# Patient Record
Sex: MALE | Race: WHITE | NOT HISPANIC OR LATINO | Employment: FULL TIME | URBAN - METROPOLITAN AREA
[De-identification: names, ages, dates, MRNs, and addresses within clinical notes are randomized per-mention and may not be internally consistent; named-entity substitution may affect disease eponyms.]

---

## 2023-10-12 PROBLEM — E78.2 MIXED HYPERLIPIDEMIA: Status: ACTIVE | Noted: 2023-10-12

## 2023-10-12 PROBLEM — Z86.16 PERSONAL HISTORY OF COVID-19: Status: ACTIVE | Noted: 2023-09-08

## 2023-10-12 PROBLEM — Z86.79 HISTORY OF ATRIAL FIBRILLATION: Status: ACTIVE | Noted: 2023-10-12

## 2023-10-12 PROBLEM — R93.1 ELEVATED CORONARY ARTERY CALCIUM SCORE: Status: ACTIVE | Noted: 2023-10-12

## 2023-10-12 PROBLEM — G47.33 OSA ON CPAP: Status: ACTIVE | Noted: 2023-10-12

## 2023-10-12 PROBLEM — E78.5 DYSLIPIDEMIA: Status: ACTIVE | Noted: 2023-09-08

## 2023-10-12 PROBLEM — I48.19 PERSISTENT ATRIAL FIBRILLATION (MULTI): Status: ACTIVE | Noted: 2023-10-12

## 2023-10-12 PROBLEM — R97.20 ELEVATED PSA: Status: ACTIVE | Noted: 2023-10-12

## 2023-10-12 PROBLEM — N40.0 BENIGN PROSTATIC HYPERPLASIA WITHOUT URINARY OBSTRUCTION: Status: ACTIVE | Noted: 2023-10-12

## 2023-10-12 RX ORDER — ATORVASTATIN CALCIUM 10 MG/1
10 TABLET, FILM COATED ORAL
COMMUNITY
Start: 2022-01-12 | End: 2023-10-18 | Stop reason: ALTCHOICE

## 2023-10-12 RX ORDER — METOPROLOL SUCCINATE 50 MG/1
50 TABLET, EXTENDED RELEASE ORAL EVERY 12 HOURS
COMMUNITY
Start: 2023-07-10 | End: 2023-10-18 | Stop reason: ALTCHOICE

## 2023-10-12 RX ORDER — DIGOXIN 250 MCG
250 TABLET ORAL DAILY
COMMUNITY
Start: 2021-11-23 | End: 2023-10-18 | Stop reason: ALTCHOICE

## 2023-10-14 PROBLEM — I48.0 PAROXYSMAL ATRIAL FIBRILLATION (MULTI): Status: ACTIVE | Noted: 2023-10-14

## 2023-10-14 PROBLEM — R53.81 MALAISE AND FATIGUE: Status: ACTIVE | Noted: 2023-10-14

## 2023-10-14 PROBLEM — R53.83 MALAISE AND FATIGUE: Status: ACTIVE | Noted: 2023-10-14

## 2023-10-16 ENCOUNTER — OFFICE VISIT (OUTPATIENT)
Dept: CARDIOLOGY | Facility: CLINIC | Age: 63
End: 2023-10-16
Payer: COMMERCIAL

## 2023-10-16 VITALS
DIASTOLIC BLOOD PRESSURE: 86 MMHG | SYSTOLIC BLOOD PRESSURE: 130 MMHG | HEIGHT: 69 IN | OXYGEN SATURATION: 96 % | BODY MASS INDEX: 32.88 KG/M2 | HEART RATE: 73 BPM | RESPIRATION RATE: 20 BRPM | WEIGHT: 222 LBS

## 2023-10-16 DIAGNOSIS — Z51.81 ENCOUNTER FOR MONITORING DOFETILIDE THERAPY: ICD-10-CM

## 2023-10-16 DIAGNOSIS — I48.19 PERSISTENT ATRIAL FIBRILLATION (MULTI): Primary | ICD-10-CM

## 2023-10-16 DIAGNOSIS — Z79.899 ENCOUNTER FOR MONITORING DOFETILIDE THERAPY: ICD-10-CM

## 2023-10-16 PROCEDURE — 3008F BODY MASS INDEX DOCD: CPT | Performed by: NURSE PRACTITIONER

## 2023-10-16 PROCEDURE — 93010 ELECTROCARDIOGRAM REPORT: CPT | Performed by: INTERNAL MEDICINE

## 2023-10-16 PROCEDURE — 93005 ELECTROCARDIOGRAM TRACING: CPT | Performed by: NURSE PRACTITIONER

## 2023-10-16 PROCEDURE — 99214 OFFICE O/P EST MOD 30 MIN: CPT | Performed by: NURSE PRACTITIONER

## 2023-10-16 PROCEDURE — 1036F TOBACCO NON-USER: CPT | Performed by: NURSE PRACTITIONER

## 2023-10-18 ASSESSMENT — ENCOUNTER SYMPTOMS
PND: 0
HEMOPTYSIS: 0
SPUTUM PRODUCTION: 0
ORTHOPNEA: 0
NAUSEA: 0
WEAKNESS: 0
DOUBLE VISION: 0
LIGHT-HEADEDNESS: 0
SNORING: 0
NEAR-SYNCOPE: 0
DYSPNEA ON EXERTION: 0
COUGH: 0
SORE THROAT: 0
DIZZINESS: 0
FEVER: 0
MYALGIAS: 0
IRREGULAR HEARTBEAT: 0
SYNCOPE: 0
BLURRED VISION: 0
PALPITATIONS: 0
VOMITING: 0
ABDOMINAL PAIN: 0
DIARRHEA: 0
DIAPHORESIS: 0
FALLS: 0
SHORTNESS OF BREATH: 0
HEADACHES: 0

## 2023-10-18 NOTE — PROGRESS NOTES
Subjective   Moiz Reynoso is a 63 y.o. male.    Chief Complaint:  Atrial Fibrillation    Moiz Reynoso is a 61 y/o male presenting for 1 akash follow up post Dofetilide initiation.    Pt was initially referred by Dr Ospina for evaluation of AF.   PMH includes BPH, depression, DLD, KITTY (CPAP), and AF/AFL.   Treatment of his FA includes metoprolol & digoxin.   Symptoms of his AF include YOUNG, rapid HR's, and fatigue.     In the past, patient had been scheduled for atrial fibrillation ablation, however it was canceled due to family emergency.  He never rescheduled.  He took himself off all his medications because he did not feel any different on them and the anticoagulation was expensive.  He last saw Dr. Hay in July 2023, patient was in persistent A-fib and then agreed for dofetilide initiation with subsequent ablation.  He underwent dofetilide initiation 9/5/2023 - 9/8/2023.  He is scheduled for A-fib ablation on November 16    ECG 10/16/2023: Normal sinus rhythm, 76 bpm, left axis deviation, QTc 445 ms    Today patient is doing well 1 month post dofetilide initiation.  He wants to make sure that he should still have the ablation next month.  He denies any chest pain, shortness of breath, headache, diarrhea, dizziness, palpitations and syncope.  Stress Echo 11/17/21: EF 59%, negative for ischemia  Holter monitor 11/2021: persistent AF w/ avg HR of 111 bpm      Atrial Fibrillation  Symptoms are negative for chest pain, dizziness, palpitations, shortness of breath, syncope and weakness. Past medical history includes atrial fibrillation.       Review of Systems   Constitutional: Negative for diaphoresis, fever and malaise/fatigue.   HENT:  Negative for congestion and sore throat.    Eyes:  Negative for blurred vision and double vision.   Cardiovascular:  Negative for chest pain, dyspnea on exertion, irregular heartbeat, leg swelling, near-syncope, orthopnea, palpitations, paroxysmal nocturnal dyspnea and syncope.    Respiratory:  Negative for cough, hemoptysis, shortness of breath, snoring and sputum production.    Hematologic/Lymphatic: Negative for bleeding problem.   Skin:  Negative for rash.   Musculoskeletal:  Negative for falls, joint pain and myalgias.   Gastrointestinal:  Negative for abdominal pain, diarrhea, nausea and vomiting.   Neurological:  Negative for dizziness, headaches, light-headedness and weakness.   All other systems reviewed and are negative.      Objective   Constitutional:       Appearance: Healthy appearance. Not in distress.   Eyes:      Conjunctiva/sclera: Conjunctivae normal.   HENT:      Nose: Nose normal.    Mouth/Throat:      Pharynx: Oropharynx is clear.   Pulmonary:      Effort: Pulmonary effort is normal.      Breath sounds: Normal breath sounds. No wheezing. No rhonchi.   Chest:      Chest wall: Not tender to palpatation.   Cardiovascular:      Normal rate. Regular rhythm.      Murmurs: There is no murmur.      No rub.   Pulses:     Intact distal pulses.   Edema:     Peripheral edema absent.   Abdominal:      General: Bowel sounds are normal.      Palpations: Abdomen is soft.   Musculoskeletal: Normal range of motion.      Cervical back: Neck supple. Skin:     General: Skin is warm and dry.   Neurological:      Mental Status: Alert and oriented to person, place and time.      Motor: Motor function is intact.       Assessment/Plan   The encounter diagnosis was Persistent atrial fibrillation (CMS/HCC).  Patient maintaining normal sinus rhythm on dofetilide.  We discussed the risks and benefits of radiofrequency ablation, despite maintaining normal rhythm on dofetilide.  Efficacy of antiarrhythmic drugs after 5 years is only about 30%.  Patient's goal is to eventually get off all medications.  Patient encouraged to proceed with ablation in November and he could potentially stop both dofetilide and Xarelto in the future  All questions asked and answered    Continue dofetilide and Xarelto  Plan  for A-fib ablation in November, I will follow-up with him 1 month post ablation.

## 2023-10-28 LAB
ATRIAL RATE: 76 BPM
P AXIS: 11 DEGREES
P OFFSET: 185 MS
P ONSET: 150 MS
PR INTERVAL: 124 MS
Q ONSET: 212 MS
QRS COUNT: 12 BEATS
QRS DURATION: 82 MS
QT INTERVAL: 396 MS
QTC CALCULATION(BAZETT): 445 MS
QTC FREDERICIA: 428 MS
R AXIS: -39 DEGREES
T AXIS: 24 DEGREES
T OFFSET: 410 MS
VENTRICULAR RATE: 76 BPM

## 2023-10-31 ENCOUNTER — PHARMACY VISIT (OUTPATIENT)
Dept: PHARMACY | Facility: CLINIC | Age: 63
End: 2023-10-31
Payer: COMMERCIAL

## 2023-10-31 PROCEDURE — RXMED WILLOW AMBULATORY MEDICATION CHARGE

## 2023-10-31 RX ORDER — POLYETHYLENE GLYCOL 3350, SODIUM SULFATE ANHYDROUS, SODIUM BICARBONATE, SODIUM CHLORIDE, POTASSIUM CHLORIDE 236; 22.74; 6.74; 5.86; 2.97 G/4L; G/4L; G/4L; G/4L; G/4L
POWDER, FOR SOLUTION ORAL
Qty: 4000 ML | Refills: 0 | OUTPATIENT
Start: 2023-10-31 | End: 2023-11-17 | Stop reason: HOSPADM

## 2023-11-15 NOTE — H&P
History Of Present Illness  Moiz Reynoso is a 63 y.o. male presenting for his elective RFA procedure for his persistence AF.    Moiz Reynoso is a 61 y/o male presenting for 1.5 yr follow-up for AF.      Pt was initially referred by Dr Ospina for evaluation of AF.      PMH includes BPH, depression, DLD, KITTY (CPAP), and AF/AFL.      Treatment of his AF includes metoprolol & digoxin.      Symptoms of his AF include YOUNG, rapid HR's, and fatigue.      Pt was last seen in 12/2021 to discuss AF ablation for persistent AF. Pt was scheduled for AF ablation on 4/26/23. Pt had to cancel his procedure due to a family emergency. Pt did not elect to reschedule his procedure due to reservations with the procedure at that time. He followed up with Dr Ospina in 11/2022 and remained rate controlled in AF. Pt recently was evaluated by Dr Ospina in November. He wore a heart monitor at the time which showed a 100% AF burden w/ an avg HR of 111 bpm. Digoxin was added at that time for better HR control.      Pt was previously managed on metoprolol, digoxin and Eliquis. HE took himself all his medications as he did not feel any different while on them. He stopped his blood thinner due to cost. Pt presents today to discuss AF ablation for persistent AF. Since, pt has continued to not symptoms associated with his AF. Pt presents today to re-discuss AF ablation for management of his persistent AF.      Stress Echo 11/17/21: EF 59%, negative for ischemia  Holter monitor 11/2021: persistent AF w/ avg HR of 111 bpm     Past Medical History  Past Medical History:   Diagnosis Date    Personal history of other diseases of the circulatory system     History of atrial fibrillation    Personal history of other diseases of the nervous system and sense organs     History of sleep apnea       Surgical History  Past Surgical History:   Procedure Laterality Date    OTHER SURGICAL HISTORY  11/24/2021    Meniscus repair        Social History  He  reports that he has never smoked. He has never used smokeless tobacco. He reports that he does not currently use alcohol. He reports that he does not use drugs.    Family History  Family History   Problem Relation Name Age of Onset    Arthritis Mother      Diabetes Father          Allergies  Patient has no known allergies.    Review of Systems   All other systems reviewed and are negative.       Physical Exam  Vitals reviewed.   Constitutional:       Appearance: Normal appearance. He is normal weight.   HENT:      Head: Normocephalic and atraumatic.      Nose: Nose normal.      Mouth/Throat:      Mouth: Mucous membranes are moist.   Eyes:      Extraocular Movements: Extraocular movements intact.      Conjunctiva/sclera: Conjunctivae normal.      Pupils: Pupils are equal, round, and reactive to light.   Cardiovascular:      Rate and Rhythm: Normal rate and regular rhythm.      Pulses: Normal pulses.      Heart sounds: Normal heart sounds.   Pulmonary:      Effort: Pulmonary effort is normal.      Breath sounds: Normal breath sounds.   Abdominal:      General: Abdomen is flat.   Musculoskeletal:         General: Normal range of motion.      Cervical back: Normal range of motion and neck supple.   Skin:     General: Skin is warm.   Neurological:      General: No focal deficit present.      Mental Status: He is alert and oriented to person, place, and time. Mental status is at baseline.          Last Recorded Vitals  There were no vitals taken for this visit.    Relevant Results         Assessment/Plan   Active Problems:  There are no active Hospital Problems.    EP will perform the elective RFA procedure for his persistent AF with trans-septal catheterization and general anesthesia. His recently started  Dofetilide 250 mcg BID will be resumed after the completion of this procedure.    I spent 20 minutes in the professional and overall care of this patient.      Edgard Shankar MD

## 2023-11-16 ENCOUNTER — HOSPITAL ENCOUNTER (OUTPATIENT)
Facility: HOSPITAL | Age: 63
Discharge: HOME | End: 2023-11-17
Attending: INTERNAL MEDICINE | Admitting: INTERNAL MEDICINE
Payer: COMMERCIAL

## 2023-11-16 ENCOUNTER — ANESTHESIA EVENT (OUTPATIENT)
Dept: CARDIOLOGY | Facility: HOSPITAL | Age: 63
End: 2023-11-16
Payer: COMMERCIAL

## 2023-11-16 ENCOUNTER — ANESTHESIA (OUTPATIENT)
Dept: CARDIOLOGY | Facility: HOSPITAL | Age: 63
End: 2023-11-16
Payer: COMMERCIAL

## 2023-11-16 DIAGNOSIS — I48.19 PERSISTENT ATRIAL FIBRILLATION (MULTI): ICD-10-CM

## 2023-11-16 DIAGNOSIS — I48.91 ATRIAL FIBRILLATION (MULTI): Primary | ICD-10-CM

## 2023-11-16 PROCEDURE — 2500000004 HC RX 250 GENERAL PHARMACY W/ HCPCS (ALT 636 FOR OP/ED): Performed by: ANESTHESIOLOGY

## 2023-11-16 PROCEDURE — 2500000004 HC RX 250 GENERAL PHARMACY W/ HCPCS (ALT 636 FOR OP/ED): Performed by: INTERNAL MEDICINE

## 2023-11-16 PROCEDURE — C1766 INTRO/SHEATH,STRBLE,NON-PEEL: HCPCS | Performed by: INTERNAL MEDICINE

## 2023-11-16 PROCEDURE — 3700000002 HC GENERAL ANESTHESIA TIME - EACH INCREMENTAL 1 MINUTE: Performed by: INTERNAL MEDICINE

## 2023-11-16 PROCEDURE — 93657 TX L/R ATRIAL FIB ADDL: CPT | Performed by: INTERNAL MEDICINE

## 2023-11-16 PROCEDURE — 36620 INSERTION CATHETER ARTERY: CPT | Performed by: INTERNAL MEDICINE

## 2023-11-16 PROCEDURE — 7100000011 HC EXTENDED STAY RECOVERY HOURLY - NURSING UNIT

## 2023-11-16 PROCEDURE — 7100000009 HC PHASE TWO TIME - INITIAL BASE CHARGE: Performed by: INTERNAL MEDICINE

## 2023-11-16 PROCEDURE — A93656 PR EPHYS EVL TRNSPTL TX ATRIAL FIB ISOLAT PULM VEIN: Performed by: ANESTHESIOLOGY

## 2023-11-16 PROCEDURE — 93010 ELECTROCARDIOGRAM REPORT: CPT | Performed by: INTERNAL MEDICINE

## 2023-11-16 PROCEDURE — C1731 CATH, EP, 20 OR MORE ELEC: HCPCS | Performed by: INTERNAL MEDICINE

## 2023-11-16 PROCEDURE — C2630 CATH, EP, COOL-TIP: HCPCS | Performed by: INTERNAL MEDICINE

## 2023-11-16 PROCEDURE — 36620 INSERTION CATHETER ARTERY: CPT | Performed by: ANESTHESIOLOGY

## 2023-11-16 PROCEDURE — 92960 CARDIOVERSION ELECTRIC EXT: CPT | Performed by: INTERNAL MEDICINE

## 2023-11-16 PROCEDURE — C1759 CATH, INTRA ECHOCARDIOGRAPHY: HCPCS | Performed by: INTERNAL MEDICINE

## 2023-11-16 PROCEDURE — 2500000004 HC RX 250 GENERAL PHARMACY W/ HCPCS (ALT 636 FOR OP/ED): Performed by: ANESTHESIOLOGIST ASSISTANT

## 2023-11-16 PROCEDURE — 93005 ELECTROCARDIOGRAM TRACING: CPT | Mod: 59

## 2023-11-16 PROCEDURE — 93656 COMPRE EP EVAL ABLTJ ATR FIB: CPT | Performed by: INTERNAL MEDICINE

## 2023-11-16 PROCEDURE — 85347 COAGULATION TIME ACTIVATED: CPT

## 2023-11-16 PROCEDURE — C1760 CLOSURE DEV, VASC: HCPCS | Performed by: INTERNAL MEDICINE

## 2023-11-16 PROCEDURE — 2500000005 HC RX 250 GENERAL PHARMACY W/O HCPCS: Performed by: ANESTHESIOLOGIST ASSISTANT

## 2023-11-16 PROCEDURE — C1730 CATH, EP, 19 OR FEW ELECT: HCPCS | Performed by: INTERNAL MEDICINE

## 2023-11-16 PROCEDURE — 2780000003 HC OR 278 NO HCPCS: Performed by: INTERNAL MEDICINE

## 2023-11-16 PROCEDURE — 7100000010 HC PHASE TWO TIME - EACH INCREMENTAL 1 MINUTE: Performed by: INTERNAL MEDICINE

## 2023-11-16 PROCEDURE — 2720000007 HC OR 272 NO HCPCS: Performed by: INTERNAL MEDICINE

## 2023-11-16 PROCEDURE — 3700000001 HC GENERAL ANESTHESIA TIME - INITIAL BASE CHARGE: Performed by: INTERNAL MEDICINE

## 2023-11-16 PROCEDURE — A93656 PR EPHYS EVL TRNSPTL TX ATRIAL FIB ISOLAT PULM VEIN: Performed by: ANESTHESIOLOGIST ASSISTANT

## 2023-11-16 RX ORDER — PROPOFOL 10 MG/ML
INJECTION, EMULSION INTRAVENOUS AS NEEDED
Status: DISCONTINUED | OUTPATIENT
Start: 2023-11-16 | End: 2023-11-16

## 2023-11-16 RX ORDER — ONDANSETRON HYDROCHLORIDE 2 MG/ML
INJECTION, SOLUTION INTRAVENOUS AS NEEDED
Status: DISCONTINUED | OUTPATIENT
Start: 2023-11-16 | End: 2023-11-16

## 2023-11-16 RX ORDER — PANTOPRAZOLE SODIUM 40 MG/1
40 TABLET, DELAYED RELEASE ORAL
Status: DISCONTINUED | OUTPATIENT
Start: 2023-11-17 | End: 2023-11-17 | Stop reason: HOSPADM

## 2023-11-16 RX ORDER — PROPOFOL 10 MG/ML
INJECTION, EMULSION INTRAVENOUS CONTINUOUS PRN
Status: DISCONTINUED | OUTPATIENT
Start: 2023-11-16 | End: 2023-11-16

## 2023-11-16 RX ORDER — MIDAZOLAM HYDROCHLORIDE 1 MG/ML
INJECTION, SOLUTION INTRAMUSCULAR; INTRAVENOUS AS NEEDED
Status: DISCONTINUED | OUTPATIENT
Start: 2023-11-16 | End: 2023-11-16

## 2023-11-16 RX ORDER — SODIUM CHLORIDE, SODIUM LACTATE, POTASSIUM CHLORIDE, CALCIUM CHLORIDE 600; 310; 30; 20 MG/100ML; MG/100ML; MG/100ML; MG/100ML
100 INJECTION, SOLUTION INTRAVENOUS CONTINUOUS
Status: DISCONTINUED | OUTPATIENT
Start: 2023-11-16 | End: 2023-11-17 | Stop reason: HOSPADM

## 2023-11-16 RX ORDER — DOFETILIDE 0.25 MG/1
250 CAPSULE ORAL EVERY 12 HOURS
Status: DISCONTINUED | OUTPATIENT
Start: 2023-11-16 | End: 2023-11-17 | Stop reason: HOSPADM

## 2023-11-16 RX ORDER — ROCURONIUM BROMIDE 10 MG/ML
INJECTION, SOLUTION INTRAVENOUS AS NEEDED
Status: DISCONTINUED | OUTPATIENT
Start: 2023-11-16 | End: 2023-11-16

## 2023-11-16 RX ORDER — HEPARIN SODIUM 1000 [USP'U]/ML
INJECTION, SOLUTION INTRAVENOUS; SUBCUTANEOUS AS NEEDED
Status: DISCONTINUED | OUTPATIENT
Start: 2023-11-16 | End: 2023-11-16 | Stop reason: HOSPADM

## 2023-11-16 RX ORDER — HEPARIN SODIUM 10000 [USP'U]/100ML
INJECTION, SOLUTION INTRAVENOUS CONTINUOUS PRN
Status: DISCONTINUED | OUTPATIENT
Start: 2023-11-16 | End: 2023-11-16 | Stop reason: HOSPADM

## 2023-11-16 RX ORDER — FENTANYL CITRATE 50 UG/ML
INJECTION, SOLUTION INTRAMUSCULAR; INTRAVENOUS AS NEEDED
Status: DISCONTINUED | OUTPATIENT
Start: 2023-11-16 | End: 2023-11-16

## 2023-11-16 RX ORDER — LIDOCAINE HYDROCHLORIDE 10 MG/ML
0.1 INJECTION, SOLUTION EPIDURAL; INFILTRATION; INTRACAUDAL; PERINEURAL ONCE
Status: DISCONTINUED | OUTPATIENT
Start: 2023-11-16 | End: 2023-11-17 | Stop reason: HOSPADM

## 2023-11-16 RX ORDER — PHENYLEPHRINE 10 MG/250 ML(40 MCG/ML)IN 0.9 % SOD.CHLORIDE INTRAVENOUS
CONTINUOUS PRN
Status: DISCONTINUED | OUTPATIENT
Start: 2023-11-16 | End: 2023-11-16

## 2023-11-16 RX ORDER — PROTAMINE SULFATE 10 MG/ML
INJECTION, SOLUTION INTRAVENOUS AS NEEDED
Status: DISCONTINUED | OUTPATIENT
Start: 2023-11-16 | End: 2023-11-16

## 2023-11-16 RX ADMIN — ROCURONIUM BROMIDE 20 MG: 10 INJECTION, SOLUTION INTRAVENOUS at 16:25

## 2023-11-16 RX ADMIN — ROCURONIUM BROMIDE 20 MG: 10 INJECTION, SOLUTION INTRAVENOUS at 17:02

## 2023-11-16 RX ADMIN — ROCURONIUM BROMIDE 20 MG: 10 INJECTION, SOLUTION INTRAVENOUS at 15:04

## 2023-11-16 RX ADMIN — SUGAMMADEX 200 MG: 100 INJECTION, SOLUTION INTRAVENOUS at 18:40

## 2023-11-16 RX ADMIN — PROPOFOL 200 MCG/KG/MIN: 10 INJECTION, EMULSION INTRAVENOUS at 14:28

## 2023-11-16 RX ADMIN — SUGAMMADEX 200 MG: 100 INJECTION, SOLUTION INTRAVENOUS at 18:26

## 2023-11-16 RX ADMIN — ROCURONIUM BROMIDE 20 MG: 10 INJECTION, SOLUTION INTRAVENOUS at 17:31

## 2023-11-16 RX ADMIN — SODIUM CHLORIDE, POTASSIUM CHLORIDE, SODIUM LACTATE AND CALCIUM CHLORIDE 100 ML/HR: 600; 310; 30; 20 INJECTION, SOLUTION INTRAVENOUS at 20:40

## 2023-11-16 RX ADMIN — PROTAMINE SULFATE 30 MG: 10 INJECTION, SOLUTION INTRAVENOUS at 18:17

## 2023-11-16 RX ADMIN — MIDAZOLAM 2 MG: 1 INJECTION INTRAMUSCULAR; INTRAVENOUS at 14:25

## 2023-11-16 RX ADMIN — ONDANSETRON 4 MG: 2 INJECTION, SOLUTION INTRAMUSCULAR; INTRAVENOUS at 18:22

## 2023-11-16 RX ADMIN — Medication 0.3 MCG/KG/MIN: at 14:47

## 2023-11-16 RX ADMIN — ROCURONIUM BROMIDE 80 MG: 10 INJECTION, SOLUTION INTRAVENOUS at 14:28

## 2023-11-16 RX ADMIN — ROCURONIUM BROMIDE 20 MG: 10 INJECTION, SOLUTION INTRAVENOUS at 15:25

## 2023-11-16 RX ADMIN — ROCURONIUM BROMIDE 20 MG: 10 INJECTION, SOLUTION INTRAVENOUS at 15:53

## 2023-11-16 RX ADMIN — SODIUM CHLORIDE, SODIUM LACTATE, POTASSIUM CHLORIDE, AND CALCIUM CHLORIDE: 600; 310; 30; 20 INJECTION, SOLUTION INTRAVENOUS at 14:25

## 2023-11-16 RX ADMIN — FENTANYL CITRATE 100 MCG: 50 INJECTION, SOLUTION INTRAMUSCULAR; INTRAVENOUS at 14:28

## 2023-11-16 RX ADMIN — PROPOFOL 200 MG: 10 INJECTION, EMULSION INTRAVENOUS at 14:28

## 2023-11-16 SDOH — SOCIAL STABILITY: SOCIAL INSECURITY: WERE YOU ABLE TO COMPLETE ALL THE BEHAVIORAL HEALTH SCREENINGS?: YES

## 2023-11-16 SDOH — SOCIAL STABILITY: SOCIAL INSECURITY: DO YOU FEEL ANYONE HAS EXPLOITED OR TAKEN ADVANTAGE OF YOU FINANCIALLY OR OF YOUR PERSONAL PROPERTY?: NO

## 2023-11-16 SDOH — SOCIAL STABILITY: SOCIAL INSECURITY: DOES ANYONE TRY TO KEEP YOU FROM HAVING/CONTACTING OTHER FRIENDS OR DOING THINGS OUTSIDE YOUR HOME?: NO

## 2023-11-16 SDOH — SOCIAL STABILITY: SOCIAL INSECURITY: HAVE YOU HAD THOUGHTS OF HARMING ANYONE ELSE?: NO

## 2023-11-16 SDOH — SOCIAL STABILITY: SOCIAL INSECURITY: HAS ANYONE EVER THREATENED TO HURT YOUR FAMILY OR YOUR PETS?: NO

## 2023-11-16 SDOH — SOCIAL STABILITY: SOCIAL INSECURITY: ARE YOU OR HAVE YOU BEEN THREATENED OR ABUSED PHYSICALLY, EMOTIONALLY, OR SEXUALLY BY ANYONE?: NO

## 2023-11-16 SDOH — SOCIAL STABILITY: SOCIAL INSECURITY: ARE THERE ANY APPARENT SIGNS OF INJURIES/BEHAVIORS THAT COULD BE RELATED TO ABUSE/NEGLECT?: NO

## 2023-11-16 SDOH — HEALTH STABILITY: MENTAL HEALTH: CURRENT SMOKER: 0

## 2023-11-16 SDOH — SOCIAL STABILITY: SOCIAL INSECURITY: ABUSE: ADULT

## 2023-11-16 SDOH — SOCIAL STABILITY: SOCIAL INSECURITY: DO YOU FEEL UNSAFE GOING BACK TO THE PLACE WHERE YOU ARE LIVING?: NO

## 2023-11-16 ASSESSMENT — COGNITIVE AND FUNCTIONAL STATUS - GENERAL
DAILY ACTIVITIY SCORE: 24
PATIENT BASELINE BEDBOUND: NO
MOBILITY SCORE: 24

## 2023-11-16 ASSESSMENT — PATIENT HEALTH QUESTIONNAIRE - PHQ9
2. FEELING DOWN, DEPRESSED OR HOPELESS: NOT AT ALL
1. LITTLE INTEREST OR PLEASURE IN DOING THINGS: NOT AT ALL
SUM OF ALL RESPONSES TO PHQ9 QUESTIONS 1 & 2: 0

## 2023-11-16 ASSESSMENT — LIFESTYLE VARIABLES
AUDIT-C TOTAL SCORE: 0
PRESCIPTION_ABUSE_PAST_12_MONTHS: NO
HOW OFTEN DO YOU HAVE A DRINK CONTAINING ALCOHOL: NEVER
HOW MANY STANDARD DRINKS CONTAINING ALCOHOL DO YOU HAVE ON A TYPICAL DAY: PATIENT DOES NOT DRINK
AUDIT-C TOTAL SCORE: 0
HOW OFTEN DO YOU HAVE 6 OR MORE DRINKS ON ONE OCCASION: NEVER
SKIP TO QUESTIONS 9-10: 1
SUBSTANCE_ABUSE_PAST_12_MONTHS: NO

## 2023-11-16 ASSESSMENT — CHA2DS2 SCORE
CHF OR LEFT VENTRICULAR DYSFUNCTION: NO
VASCULAR DISEASE: NO
AGE IN YEARS: <65
CHA2D2S VASC SCORE: 0
HYPERTENSION: NO
DIABETES: NO
PRIOR STROKE OR TIA OR THROMBOEMBOLISM: NO
SEX: MALE

## 2023-11-16 ASSESSMENT — ACTIVITIES OF DAILY LIVING (ADL)
HEARING - LEFT EAR: FUNCTIONAL
JUDGMENT_ADEQUATE_SAFELY_COMPLETE_DAILY_ACTIVITIES: YES
HEARING - RIGHT EAR: FUNCTIONAL
FEEDING YOURSELF: INDEPENDENT
LACK_OF_TRANSPORTATION: PATIENT DECLINED
DRESSING YOURSELF: INDEPENDENT
GROOMING: INDEPENDENT
TOILETING: INDEPENDENT
ADEQUATE_TO_COMPLETE_ADL: YES
PATIENT'S MEMORY ADEQUATE TO SAFELY COMPLETE DAILY ACTIVITIES?: YES
BATHING: INDEPENDENT
WALKS IN HOME: INDEPENDENT

## 2023-11-16 ASSESSMENT — COLUMBIA-SUICIDE SEVERITY RATING SCALE - C-SSRS
6. HAVE YOU EVER DONE ANYTHING, STARTED TO DO ANYTHING, OR PREPARED TO DO ANYTHING TO END YOUR LIFE?: NO
2. HAVE YOU ACTUALLY HAD ANY THOUGHTS OF KILLING YOURSELF?: NO
1. IN THE PAST MONTH, HAVE YOU WISHED YOU WERE DEAD OR WISHED YOU COULD GO TO SLEEP AND NOT WAKE UP?: NO

## 2023-11-16 ASSESSMENT — PAIN SCALES - GENERAL
PAINLEVEL_OUTOF10: 0 - NO PAIN
PAINLEVEL_OUTOF10: 0 - NO PAIN

## 2023-11-16 ASSESSMENT — PAIN - FUNCTIONAL ASSESSMENT
PAIN_FUNCTIONAL_ASSESSMENT: 0-10
PAIN_FUNCTIONAL_ASSESSMENT: 0-10

## 2023-11-16 NOTE — ANESTHESIA PROCEDURE NOTES
Airway  Date/Time: 11/16/2023 2:31 PM  Urgency: elective    Airway not difficult    Staffing  Performed: BARRY   Authorized by: Brandon Pretty MD    Performed by: BARRY Hartman  Patient location during procedure: OR    Indications and Patient Condition  Indications for airway management: anesthesia  Spontaneous Ventilation: absent  Sedation level: deep  Preoxygenated: yes  Patient position: sniffing  Mask difficulty assessment: 2 - vent by mask + OA or adjuvant +/- NMBA  Planned trial extubation    Final Airway Details  Final airway type: endotracheal airway      Successful airway: ETT  Cuffed: yes   Successful intubation technique: direct laryngoscopy  Facilitating devices/methods: intubating stylet  Blade: Renetta  Blade size: #4  ETT size (mm): 7.5  Cormack-Lehane Classification: grade I - full view of glottis  Placement verified by: chest auscultation and capnometry   Measured from: lips  ETT to lips (cm): 23  Number of attempts at approach: 1

## 2023-11-16 NOTE — ANESTHESIA PROCEDURE NOTES
Arterial Line:    Date/Time: 11/16/2023 2:43 PM    Staffing  Performed: attending   Authorized by: Brandon Pretty MD    Performed by: BARRY Hartman    An arterial line was placed. Procedure performed using ultrasound guidance.in the OR for the following indication(s): continuous blood pressure monitoring and blood sampling needed.    A 20 gauge (size) (length), Angiocath (type) catheter was placed into the Left radial artery, secured by Tegaderm,   Seldinger technique used.  Events:  patient tolerated procedure well with no complications.

## 2023-11-16 NOTE — PROGRESS NOTES
Pharmacy Medication History Review    Moiz Reynoso is a 63 y.o. male admitted for No Principal Problem: There is no principal problem currently on the Problem List. Please update the Problem List and refresh.. Pharmacy reviewed the patient's eydhb-uh-cjfudleid medications and allergies for accuracy.    The list below reflects the updated PTA list. Comments regarding how patient may be taking medications differently can be found in the Admit Orders Activity  Medications Prior to Admission   Medication Sig Dispense Refill Last Dose    rivaroxaban (Xarelto) 20 mg tablet Take 1 tablet (20 mg) by mouth once daily in the evening. Take with meals.   11/15/2023 at pm    diatrizoate fatoumata-diatrizoat sod (Gastrografin) solution to be takern before bed (Patient not taking: Reported on 11/16/2023) 30 mL 0 Not Taking    dofetilide (Tikosyn) 250 mcg capsule TAKE 1 CAPSULE BY MOUTH EVERY 12 HOURS. 60 capsule 0 11/13/2023 at pm    polyethylene glycol-electrolytes (polyethylene glycol) 420 gram solution Begin drinking at noon as directed (Patient not taking: Reported on 11/16/2023) 4000 mL 0 Not Taking        The list below reflects the updated allergy list. Please review each documented allergy for additional clarification and justification.  Allergies  Reviewed by Shantanu Tamayo PharmD on 11/16/2023        Severity Reactions Comments    Latex Low Itching, Rash             Patient declines M2B at discharge. Pharmacy has been updated to [facility] in [place].    Sources used to complete the med history include out patient fill history, OARRS, and patient interview - reliable historian    Below are additional concerns with the patient's PTA list.  N/A    Shantanu Tamayo PharmD  Transitions of Care Pharmacist  [facility]  Please reach out via Secure Chat for questions, or if no response call [employer] or [facility]

## 2023-11-16 NOTE — Clinical Note
Sheath was exchanged in the right femoral vein with INTRODUCER, HEMOSTASIS, STR/J .038 IN, 8.5FR 12CM.

## 2023-11-16 NOTE — ANESTHESIA PROCEDURE NOTES
Peripheral IV  Date/Time: 11/16/2023 2:40 PM      Placement  Needle size: 18 G  Laterality: right  Location: hand  Site prep: alcohol  Attempts: 2

## 2023-11-16 NOTE — Clinical Note
Patient ID band present and verified. Patient contact is in waiting room. Contact(s) present: spouse. Contact name: Salena. Contact # : [phone].

## 2023-11-16 NOTE — ANESTHESIA PREPROCEDURE EVALUATION
Patient: Moiz Reynoso    Procedure Information       Date/Time: 11/16/23 1230    Procedure: Ablation A-Fib Persistant - SDA/RFA/ AFIB/ CARTO/ GA whole    [place] [facility] [place] [facility] Cardiac Cath Lab    Providers: Nico Hay MD            Relevant Problems   Cardiovascular   (+) Mixed hyperlipidemia   (+) Paroxysmal atrial fibrillation (CMS/HCC)   (+) Persistent atrial fibrillation (CMS/HCC)      Pulmonary   (+) KITTY on CPAP       Clinical information reviewed:   Tobacco  Allergies  Meds   Med Hx  Surg Hx   Fam Hx  Soc Hx        NPO Detail:  NPO/Void Status  Date of Last Liquid: 11/15/23  Date of Last Solid: 11/15/23         Physical Exam    Airway  Mallampati: I  TM distance: >3 FB  Neck ROM: full     Cardiovascular   Rhythm: irregular     Dental    Pulmonary    Abdominal            Anesthesia Plan    ASA 3     general     The patient is not a current smoker.    intravenous induction   Anesthetic plan and risks discussed with patient.        EF in 2021 was 60%  GETA with post-induction A-Line.  Procedure explained, risks discussed, all questions answered.  He agrees to proceed.

## 2023-11-16 NOTE — DISCHARGE INSTRUCTIONS
INSTRUCTIONS AFTER ABLATION PROCEDURE:    * You will need to continue blood thinner (Xarelto) until instructed otherwise. It is important not to interrupt blood thinner for any reason (other than an emergency) during the 1st month after ablation. Xarelto needs to be taken daily with a meal in order for the drug to be properly absorbed (eg do not take it on an empty stomach).     * You will be on Pantoprazole (a heartburn medicine) for 4 weeks to protect the esophagus as it can become irritated with ablation. It is very important that you take this medication.    * All other medications will generally remain the same unless you are told otherwise.  Resume taking your home medications today (including blood thinner) as listed on the discharge instructions.    * In the first week post-ablation you should take it easy. No heavy lifting or heavy exercise, no treadmill. You can use the stairs if needed but go slowly and minimize the number of times up and down.    * Some minor bruising is common at each groin access site with minor soreness as if you had banged the area. Bruising may occasionally be seen to extend down the leg. This is normal as is an occasional small quarter sized bump in the area. If larger swelling or more significant pain occurs at the area, please contact the office or go the nearest Emergency Room.    * You may have some minor chest pain for the next week or so. The pain will often worsen with a deep breath and be better when leaning forward. This is pericardial chest pain from the ablation and is generally not of concern. It should resolve within a week although it might increase for a day or so after the ablation.    * If you develop unexplained fevers exceeding 100 degrees anytime within the first 3 weeks post-ablation, you need to contact the office. Low grade fevers of around 99 degrees are common in the first day or so post-ablation.    * Atrial fibrillation (AFib) can recur in all patients  who undergo this ablation for up to 4-8 weeks post-ablation. The ablation itself can cause inflammation (pericarditis) in the atria and this can cause AFib. Some patients will actually experience an increased amount of atrial arrhythmia early after ablation. Approximately 1/3 of patients will have this early recurrence of AFib. Medications should be continued and your heart rate controlled. Nothing else needs be done initially except waiting as in many cases these episodes of AFib will prove self limited.    * Continue to follow up with your primary care physician, primary cardiologist, and any other specialists you normally see.    *No driving for 2 days post procedure (IF you were driving prior to procedure)    *Diet: Heart healthy    Call Provider If:  Breathing faster than normal.     Fever of 100.4 F (38 C) or higher.     Chills.     Any new concerning symptoms.     Passing out.     Patient Instructions, Next 24 hours:  DO NOT drive a car, operate machinery or power tools.  It is recommended that a responsible adult be with you for the first 24 hours.     DO NOT drink any alcoholic drinks or take any non-prescriptive medications that contain alcohol for the first 24 hours.     DO NOT make any important decisions for the first 24 hours.    Activity:  You are advised to go directly home from the hospital.     DO NOT lift anything heavier than 10 pounds for one week, this allows for proper healing of the groin.     No excessive exercise or treadmill use for one week. You may walk and do stairs, slowly.     No sexual activities for 24 hours after you arrive home.    Wound Care:  If slight bleeding should occur at groin site, lie down and have someone apply firm pressure just above the puncture site for 5 minutes.  If it continues or is profuse, call 911. Always notify your doctor if bleeding occurs.     Keep site clean and dry. Let air dry or you may use a simple bandaid.     Gently cleanse the puncture site in  your groin with soap and water only.     You may experience some tenderness, bruising or minimal inflammation.  If you have any concerns, you may contact the EP Lab or if any of these symptoms become excessive, contact your electrophysiologist or go to the emergency room.     No tub baths, soaking, hot tubs, or swimming for one week.     May shower the next day after your procedure.    Other Instructions:  If you have any questions about the effects of the sedative drugs or groin care, call the physician who performed your procedure.    FOLLOW UP:  1) Primary care physician 2 weeks--call to schedule    2) Samira El CNP ( Electrophysiology) 1 month after ablation as scheduled

## 2023-11-17 ENCOUNTER — HOSPITAL ENCOUNTER (OUTPATIENT)
Dept: CARDIOLOGY | Facility: HOSPITAL | Age: 63
Discharge: HOME | End: 2023-11-17

## 2023-11-17 VITALS
SYSTOLIC BLOOD PRESSURE: 122 MMHG | TEMPERATURE: 98.6 F | OXYGEN SATURATION: 100 % | HEIGHT: 69 IN | DIASTOLIC BLOOD PRESSURE: 81 MMHG | WEIGHT: 222.44 LBS | HEART RATE: 85 BPM | BODY MASS INDEX: 32.95 KG/M2 | RESPIRATION RATE: 18 BRPM

## 2023-11-17 LAB
ANION GAP SERPL CALC-SCNC: 9 MMOL/L (ref 10–20)
BUN SERPL-MCNC: 12 MG/DL (ref 6–23)
CALCIUM SERPL-MCNC: 8.9 MG/DL (ref 8.6–10.6)
CHLORIDE SERPL-SCNC: 106 MMOL/L (ref 98–107)
CO2 SERPL-SCNC: 27 MMOL/L (ref 21–32)
CREAT SERPL-MCNC: 0.86 MG/DL (ref 0.5–1.3)
GFR SERPL CREATININE-BSD FRML MDRD: >90 ML/MIN/1.73M*2
GLUCOSE SERPL-MCNC: 144 MG/DL (ref 74–99)
MAGNESIUM SERPL-MCNC: 1.96 MG/DL (ref 1.6–2.4)
POTASSIUM SERPL-SCNC: 4.3 MMOL/L (ref 3.5–5.3)
SODIUM SERPL-SCNC: 138 MMOL/L (ref 136–145)

## 2023-11-17 PROCEDURE — 93010 ELECTROCARDIOGRAM REPORT: CPT | Performed by: INTERNAL MEDICINE

## 2023-11-17 PROCEDURE — 80048 BASIC METABOLIC PNL TOTAL CA: CPT | Performed by: STUDENT IN AN ORGANIZED HEALTH CARE EDUCATION/TRAINING PROGRAM

## 2023-11-17 PROCEDURE — 2500000001 HC RX 250 WO HCPCS SELF ADMINISTERED DRUGS (ALT 637 FOR MEDICARE OP): Performed by: STUDENT IN AN ORGANIZED HEALTH CARE EDUCATION/TRAINING PROGRAM

## 2023-11-17 PROCEDURE — 2500000004 HC RX 250 GENERAL PHARMACY W/ HCPCS (ALT 636 FOR OP/ED): Performed by: STUDENT IN AN ORGANIZED HEALTH CARE EDUCATION/TRAINING PROGRAM

## 2023-11-17 PROCEDURE — 99239 HOSP IP/OBS DSCHRG MGMT >30: CPT | Performed by: PHYSICIAN ASSISTANT

## 2023-11-17 PROCEDURE — 36415 COLL VENOUS BLD VENIPUNCTURE: CPT | Performed by: STUDENT IN AN ORGANIZED HEALTH CARE EDUCATION/TRAINING PROGRAM

## 2023-11-17 PROCEDURE — 7100000011 HC EXTENDED STAY RECOVERY HOURLY - NURSING UNIT

## 2023-11-17 PROCEDURE — 83735 ASSAY OF MAGNESIUM: CPT | Performed by: STUDENT IN AN ORGANIZED HEALTH CARE EDUCATION/TRAINING PROGRAM

## 2023-11-17 PROCEDURE — 93005 ELECTROCARDIOGRAM TRACING: CPT

## 2023-11-17 RX ORDER — PANTOPRAZOLE SODIUM 40 MG/1
40 TABLET, DELAYED RELEASE ORAL
Qty: 30 TABLET | Refills: 0 | Status: SHIPPED | OUTPATIENT
Start: 2023-11-17 | End: 2023-12-01 | Stop reason: WASHOUT

## 2023-11-17 RX ADMIN — DOFETILIDE 250 MCG: 0.25 CAPSULE ORAL at 11:04

## 2023-11-17 RX ADMIN — PANTOPRAZOLE SODIUM 40 MG: 40 TABLET, DELAYED RELEASE ORAL at 06:29

## 2023-11-17 ASSESSMENT — PAIN SCALES - GENERAL: PAINLEVEL_OUTOF10: 0 - NO PAIN

## 2023-11-17 NOTE — ANESTHESIA POSTPROCEDURE EVALUATION
Patient: Moiz Reynoso    Procedure Summary       Date: 11/16/23 Room / Location: [facility] [place] [facility] / Virtual [facility] [place] MAT 3529 Cardiac Cath Lab    Anesthesia Start: 1410 Anesthesia Stop: 1901    Procedure: Ablation A-Fib Persistant Diagnosis:       Persistent atrial fibrillation (CMS/HCC)      (Persistent atrial fibrillation (CMS/HCC) [I48.19])    Providers: Nico Hay MD Responsible Provider: BARRY Hartman    Anesthesia Type: general ASA Status: 3            Anesthesia Type: general    Vitals Value Taken Time   /74 11/16/23 1901   Temp 36.2 11/16/23 1901   Pulse 71 11/16/23 1901   Resp 12 11/16/23 1901   SpO2 98 11/16/23 1901       Anesthesia Post Evaluation    Patient participation: complete - patient participated  Level of consciousness: awake and alert  Pain management: adequate  Airway patency: patent  Cardiovascular status: acceptable  Respiratory status: acceptable  Hydration status: acceptable  Postoperative Nausea and Vomiting: none        No notable events documented.

## 2023-11-17 NOTE — DISCHARGE SUMMARY
"Discharge Diagnosis  Atrial fibrillation s/p pulmonary vein isolation 11/16/23    Test Results Pending At Discharge: none    Hospital Course  64yo M with HLD, CAD (CAC score 1371 1/2022; pt stopped atorva due to thinking it caused DM), \"pre-DM\", normal EF by stress echo 11/2021 at OSH(neg for ischemia), KITTY (CPAP), COVID 8/15/2023 (unvaccinated), obesity (BMI 32.8), and AF/AFL statrted on Tikosyn 250mcg q12h (QT prolongation on 500mcg) 9/2023 prior to planned AF ablation 11/16/23. Metop was stopped 9/2023 for asymptomatic sinus camila.    Pt presented 11/16/23 and underwent AFib ablation (PVI0 without complication. Due to case ending late, pt was kept overnight. It is unclear why pt did not receive Tikosyn dose 11/16 PM (it was not verified by pharmacist). BMP and Mg on 11/17 were acceptable and unremarkable. EKGs post ablation were reviewed (QTc 450ms). Pt had mild intermittent pleuritic chest discomfort c/w mild post ablation pericarditis; exam without pericardial rub and no EKG findings of pericarditis compared to recent past tracings.     Case was reviewed with Dr Hay 11/17; per Dr Hay, pt does not need Tikosyn re-initiation just medication continuation. Pt received Tikosyn 250mcg 11/17 AM and was observed for 2 hours. Pt remained hemodynamically stable without arrhythmia and was in NSR. Dr Hay agreed pt was stable and appropriate for discharge. Pt will F/u with PCP in 2 weeks (pt to call) and with EP NP Samira El in 1 month as scheduled. Pt was advised to cont routine F/u with primary cardiologist as well. Pt was sent home with incentive spirometer (instructed by nurse) to be used 10x q2 hours while awake for 3-4 days to encourage deep breathing after general anesthesia.     Pertinent Physical Exam At Time of Discharge  Gen-A+O x 3  Skin-W+D  Lungs-CTAB  CV-RRR, no M/G/R  Abd-obese, soft, NT/ND, +BS  Extrem-no LE edema; RFV access site dsg removed, no bleeding, hematoma, or ecchymosis; L radial " Puja site without bleeding or hematoma; L radial pulse palpable with warm L hand + fingers  Neuro-ROGERS  Psych-appropriate mood and affect    Home Medications  START taking these medications     pantoprazole 40 mg EC tablet; Commonly known as: ProtoNix; Take 1 tablet   (40 mg) by mouth once daily in the morning. Take before meal. For 1 month   after ablation.     CONTINUE taking these medications     dofetilide 250 mcg capsule; Commonly known as: Tikosyn; TAKE 1 CAPSULE   BY MOUTH EVERY 12 HOURS.   Xarelto 20 mg tablet; Generic drug: rivaroxaban    Outpatient Follow-Up  Future Appointments   Date Time Provider Department Center   11/30/2023  9:00 AM [facility] 4 [facility]   12/14/2023  9:45 AM Samira El, APRN-CNP [id number]     Greater than 30 min were spent on medication education, post ablation teaching, and coordination of discharge.    EKTA Sexton, PA-C, [facility]  Inpatient Cardiac Electrophysiology  Personal pager: [phone] (Tues-Fri)

## 2023-11-17 NOTE — NURSING NOTE
11/17/2023 1:11 pm Nursing Discharge Note  AVS went over with patient. All questions answered. IV and Tele removed. Pt left with all belongings. Patient walked off the floor with his wife.     Tomasa Subramanian RN

## 2023-11-17 NOTE — CARE PLAN
The patient's goals for the shift include  To be discharged     The clinical goals for the shift include Pt will remain HDS throughout shift    Problem: Pain  Goal: My pain/discomfort is manageable  Outcome: Met     Problem: Safety  Goal: Patient will be injury free during hospitalization  Outcome: Met  Goal: I will remain free of falls  Outcome: Met     Problem: Daily Care  Goal: Daily care needs are met  Outcome: Met     Problem: Psychosocial Needs  Goal: Demonstrates ability to cope with hospitalization/illness  Outcome: Met  Goal: Collaborate with me, my family, and caregiver to identify my specific goals  Outcome: Met     Problem: Discharge Barriers  Goal: My discharge needs are met  Outcome: Met     Problem: Pain  Goal: Takes deep breaths with improved pain control throughout the shift  Outcome: Met  Goal: Turns in bed with improved pain control throughout the shift  Outcome: Met  Goal: Walks with improved pain control throughout the shift  Outcome: Met  Goal: Performs ADL's with improved pain control throughout shift  Outcome: Met  Goal: Participates in PT with improved pain control throughout the shift  Outcome: Met  Goal: Free from opioid side effects throughout the shift  Outcome: Met  Goal: Free from acute confusion related to pain meds throughout the shift  Outcome: Met

## 2023-11-20 ENCOUNTER — HOSPITAL ENCOUNTER (OUTPATIENT)
Dept: CARDIOLOGY | Facility: HOSPITAL | Age: 63
Discharge: HOME | End: 2023-11-20
Payer: COMMERCIAL

## 2023-11-20 LAB
ATRIAL RATE: 77 BPM
ATRIAL RATE: 81 BPM
P AXIS: 58 DEGREES
P AXIS: 8 DEGREES
P OFFSET: 186 MS
P OFFSET: 192 MS
P ONSET: 130 MS
P ONSET: 141 MS
PR INTERVAL: 142 MS
PR INTERVAL: 164 MS
Q ONSET: 212 MS
Q ONSET: 212 MS
QRS COUNT: 13 BEATS
QRS COUNT: 13 BEATS
QRS DURATION: 80 MS
QRS DURATION: 86 MS
QT INTERVAL: 386 MS
QT INTERVAL: 398 MS
QTC CALCULATION(BAZETT): 448 MS
QTC CALCULATION(BAZETT): 450 MS
QTC FREDERICIA: 426 MS
QTC FREDERICIA: 432 MS
R AXIS: -23 DEGREES
R AXIS: -30 DEGREES
T AXIS: 24 DEGREES
T AXIS: 28 DEGREES
T OFFSET: 405 MS
T OFFSET: 411 MS
VENTRICULAR RATE: 77 BPM
VENTRICULAR RATE: 81 BPM

## 2023-11-20 PROCEDURE — 93005 ELECTROCARDIOGRAM TRACING: CPT

## 2023-11-30 ENCOUNTER — APPOINTMENT (OUTPATIENT)
Dept: RADIOLOGY | Facility: HOSPITAL | Age: 63
End: 2023-11-30
Payer: COMMERCIAL

## 2023-12-01 ENCOUNTER — LAB (OUTPATIENT)
Dept: LAB | Facility: LAB | Age: 63
End: 2023-12-01
Payer: COMMERCIAL

## 2023-12-01 ENCOUNTER — TELEMEDICINE (OUTPATIENT)
Dept: UROLOGY | Facility: HOSPITAL | Age: 63
End: 2023-12-01
Payer: COMMERCIAL

## 2023-12-01 DIAGNOSIS — R97.20 ELEVATED PSA: ICD-10-CM

## 2023-12-01 DIAGNOSIS — N40.0 BENIGN PROSTATIC HYPERPLASIA WITHOUT URINARY OBSTRUCTION: ICD-10-CM

## 2023-12-01 DIAGNOSIS — R97.20 ELEVATED PSA: Primary | ICD-10-CM

## 2023-12-01 PROCEDURE — 99203 OFFICE O/P NEW LOW 30 MIN: CPT | Performed by: UROLOGY

## 2023-12-01 PROCEDURE — 36415 COLL VENOUS BLD VENIPUNCTURE: CPT

## 2023-12-01 PROCEDURE — 84154 ASSAY OF PSA FREE: CPT

## 2023-12-01 PROCEDURE — 84153 ASSAY OF PSA TOTAL: CPT

## 2023-12-01 NOTE — PROGRESS NOTES
NAME:Moiz Reynoso  DATE: 12/1/2023               Subjective:   Chief complaint: LUTS    HPI:  63 y.o. male presenting for evaluation of LUTS.      Pt with h/o HLD, CAD, KITTY, Obesity, AF s/p ablation    Pt reports around 2010 was told he had an enlarged prostate.  Has had elevated PSA.  MRI in 2017, prostate approx 85cc, no high risk lesions.  Most recent PSA in our system 11.4 (Dec 2021).       Pt with urinary urgency, some urge incontinence, hesitency, weak stream, incomplete emptying. Occasional dysuria.        Past Medical History:   Diagnosis Date    Atrial fibrillation (CMS/HCC)     Personal history of other diseases of the circulatory system     History of atrial fibrillation    Personal history of other diseases of the nervous system and sense organs     History of sleep apnea     Past Surgical History:   Procedure Laterality Date    CARDIAC ELECTROPHYSIOLOGY PROCEDURE N/A 11/16/2023    Procedure: Ablation A-Fib Persistant;  Surgeon: Nico Hay MD;  Location: [place] [address] [facility];  Service: Electrophysiology;  Laterality: N/A;  SDA/RFA/ AFIB/ CARTO/ GA whole    OTHER SURGICAL HISTORY  11/24/2021    Meniscus repair     Social History     Tobacco Use    Smoking status: Never    Smokeless tobacco: Never   Substance Use Topics    Alcohol use: Not Currently     Family History   Problem Relation Name Age of Onset    Arthritis Mother      Diabetes Father       Current Outpatient Medications on File Prior to Visit   Medication Sig Dispense Refill    dofetilide (Tikosyn) 250 mcg capsule TAKE 1 CAPSULE BY MOUTH EVERY 12 HOURS. 60 capsule 0    pantoprazole (ProtoNix) 40 mg EC tablet Take 1 tablet (40 mg) by mouth once daily in the morning. Take before meals. For 1 month after ablation. 30 tablet 0    rivaroxaban (Xarelto) 20 mg tablet Take 1 tablet (20 mg) by mouth once daily in the evening. Take with meals.       No current facility-administered medications on file prior to visit.     Moiz is  "allergic to latex.     Review of Systems    14 point ROS reviewed and discussed with the patient. Pertinent positives/negatives discussed in the History of Present Illness (HPI).      Objective:     Labs  Lab Results   Component Value Date    PSA 11.36 (H) 12/10/2021     No components found for: \"CBC\"  No results found for: \"PROLACTIN\"  Lab Results   Component Value Date    GFRMALE >90 09/08/2023     Lab Results   Component Value Date    CREATININE 0.86 11/17/2023     Lab Results   Component Value Date    TESTF 58.0 12/10/2021     Lab Results   Component Value Date    HCT 45.0 09/05/2023       Assessment/Plan:   Moiz Reynoso presents with     1. Elevated PSA    2. Benign prostatic hyperplasia without urinary obstruction      -Recheck PSA, will get MRI pending  -FU in office for cysto/trus/uroflow    Lower Urinary Tract Symptoms (LUTS)    I educated the patient on relevant lower urinary tract anatomy and physiology with emphasis on differential diagnosis as well as contributing factors to the patient's lower urinary tract symptoms. I educated the patient on dietary and behavioral modifications pertinent to the patient's complaints. I recommended to avoid caffeine, alcohol, spicy and acidic oral intake and to regulate fluid intake and voiding (timed voiding, double voiding). I stressed the importance of avoiding constipation and recommended stool softeners unless diarrhea present. We discussed limiting fluid intake at night and elevating legs prior to bedtime.     The mechanism of action as well as the risks, benefits, common side effects, and alternatives to all prescribed medications were discussed with the patient at length. The patient had the opportunity to ask questions and all questions were answered. I primarily discussed alpha blockers, 5ARIs, PDE5i, anticholinergics, and beta 3 agonist (mirabegron).  I explained that alpha blockers help decrease bladder outlet resistance with potential side effects to " include retrograde ejaculation, rhinitis, and light headedness. I explained that 5 alpha reductase inhibitors can shrink the prostate up to 30%, can artificially decrease their PSA value by 50%, but take approximately 6-9 months to reach full efficacy and have potential side effects to include decreased libido, impotence and breast tenderness.       Elevated PSA  Patient presents today for the evaluation of elevated PSA (Prostate Specific Antigen).  We discussed Prostate cancer screening, and that it is recommended for men aged 55-69, but can also start early in high risk patients ( and patients with family history of prostate cancer) and go longer in active, healthy men.  We discussed the screening process involves a digital rectal exam (LIDIA) and blood test (PSA).      We discussed the role of trans-rectal ultrasound guided prostate biopsy.  I highlighted that it is an office based procedure.  He will be given a dose of antibiotics prior to the procedure.  He was also instructed to give himself an enema the morning of the biopsy.  Risks of the biopsy including pain, blood in the urine, stool and ejaculate which can last a few weeks.  The risk of post prostate biopsy sepsis was discussed and that if signs of infection, such as fevers, chills, lethargy require a prompt evaluation.      I highlighted the role of MRI Prostate in identifying high-risk prostate cancer and images can be used to target those areas with the biopsy.

## 2023-12-04 LAB
PSA FREE MFR SERPL: 12 %
PSA FREE SERPL-MCNC: 1.8 NG/ML
PSA SERPL IA-MCNC: 14.5 NG/ML (ref 0–4)

## 2023-12-05 DIAGNOSIS — R97.20 ELEVATED PSA: Primary | ICD-10-CM

## 2023-12-07 LAB
ACT BLD: 281 SEC (ref 96–152)
ACT BLD: 304 SEC (ref 96–152)
ACT BLD: 317 SEC (ref 96–152)
ACT BLD: 330 SEC (ref 96–152)
ACT BLD: 343 SEC (ref 96–152)
ACT BLD: 345 SEC (ref 96–152)

## 2023-12-14 ENCOUNTER — OFFICE VISIT (OUTPATIENT)
Dept: CARDIOLOGY | Facility: CLINIC | Age: 63
End: 2023-12-14
Payer: COMMERCIAL

## 2023-12-14 ENCOUNTER — APPOINTMENT (OUTPATIENT)
Dept: CARDIOLOGY | Facility: CLINIC | Age: 63
End: 2023-12-14
Payer: COMMERCIAL

## 2023-12-14 VITALS
DIASTOLIC BLOOD PRESSURE: 81 MMHG | HEART RATE: 84 BPM | SYSTOLIC BLOOD PRESSURE: 124 MMHG | BODY MASS INDEX: 33.99 KG/M2 | HEIGHT: 68 IN | WEIGHT: 224.3 LBS

## 2023-12-14 DIAGNOSIS — I48.19 PERSISTENT ATRIAL FIBRILLATION (MULTI): Primary | ICD-10-CM

## 2023-12-14 PROCEDURE — 99214 OFFICE O/P EST MOD 30 MIN: CPT | Performed by: NURSE PRACTITIONER

## 2023-12-14 PROCEDURE — 3008F BODY MASS INDEX DOCD: CPT | Performed by: NURSE PRACTITIONER

## 2023-12-14 PROCEDURE — 93005 ELECTROCARDIOGRAM TRACING: CPT | Performed by: NURSE PRACTITIONER

## 2023-12-14 PROCEDURE — 1036F TOBACCO NON-USER: CPT | Performed by: NURSE PRACTITIONER

## 2023-12-14 PROCEDURE — 93010 ELECTROCARDIOGRAM REPORT: CPT | Performed by: INTERNAL MEDICINE

## 2023-12-14 RX ORDER — DOFETILIDE 0.25 MG/1
250 CAPSULE ORAL EVERY 12 HOURS
Qty: 180 CAPSULE | Refills: 1 | Status: SHIPPED | OUTPATIENT
Start: 2023-12-14 | End: 2024-04-25 | Stop reason: ALTCHOICE

## 2023-12-14 ASSESSMENT — ENCOUNTER SYMPTOMS
SORE THROAT: 0
IRREGULAR HEARTBEAT: 0
DYSPNEA ON EXERTION: 0
NEAR-SYNCOPE: 0
SNORING: 0
HEMOPTYSIS: 0
FALLS: 0
BLURRED VISION: 0
SPUTUM PRODUCTION: 0
COUGH: 0
FEVER: 0
NAUSEA: 0
DOUBLE VISION: 0
DIARRHEA: 0
LIGHT-HEADEDNESS: 0
ORTHOPNEA: 0
MYALGIAS: 0
ABDOMINAL PAIN: 0
DIAPHORESIS: 0
HEADACHES: 0
VOMITING: 0
PND: 0

## 2023-12-14 NOTE — PROGRESS NOTES
"Subjective   Moiz Reynoso is a 63 y.o. male.    Chief Complaint:  Atrial Fibrillation    Moiz Reynoso is a 63 y/o male presenting for 1 month follow up post Afib/Aflutter ablation.    Pt was initially referred by Dr Ospina for evaluation of AF.   PMH includes BPH, depression, DLD, KITTY (CPAP), and AF/AFL.   Treatment of his FA includes metoprolol & digoxin.   Symptoms of his AF include YOUNG, rapid HR's, and fatigue.     He underwent dofetilide initiation 9/5/2023 - 9/8/2023.  He is scheduled for A-fib ablation on November 16  ECG 10/16/2023: Normal sinus rhythm, 76 bpm, left axis deviation, QTc 445 ms    Stress Echo 11/17/21: EF 59%, negative for ischemia  Holter monitor 11/2021: persistent AF w/ avg HR of 111 bpm    Now s/p Afib and CTI RFA with Dr. Hay 11/16/2023  ECG 12/14/2023 NSR HR 77 bpm, Qtc 439 ms    TODAY patient is doing well 1 month post A-fib ablation.  He denies any recurrence of his arrhythmia.  He denies any chest pain, shortness of breath, dizziness, syncope and palpitations.  He denies any bleeding at the right groin site.  He is still taking his dofetilide and his Xarelto without issue.      /81 (BP Location: Right arm, Patient Position: Sitting, BP Cuff Size: Adult)   Pulse 84   Ht 1.727 m (5' 8\")   Wt 102 kg (224 lb 4.8 oz)   BMI 34.10 kg/m²     Current Outpatient Medications on File Prior to Visit   Medication Sig Dispense Refill    [DISCONTINUED] dofetilide (Tikosyn) 250 mcg capsule TAKE 1 CAPSULE BY MOUTH EVERY 12 HOURS. 60 capsule 0    [DISCONTINUED] rivaroxaban (Xarelto) 20 mg tablet Take 1 tablet (20 mg) by mouth once daily in the evening. Take with meals.       No current facility-administered medications on file prior to visit.         Review of Systems   Constitutional: Negative for diaphoresis, fever and malaise/fatigue.   HENT:  Negative for congestion and sore throat.    Eyes:  Negative for blurred vision and double vision.   Cardiovascular:  Negative for dyspnea on " exertion, irregular heartbeat, leg swelling, near-syncope, orthopnea and paroxysmal nocturnal dyspnea.   Respiratory:  Negative for cough, hemoptysis, snoring and sputum production.    Hematologic/Lymphatic: Negative for bleeding problem.   Skin:  Negative for rash.   Musculoskeletal:  Negative for falls, joint pain and myalgias.   Gastrointestinal:  Negative for abdominal pain, diarrhea, nausea and vomiting.   Neurological:  Negative for headaches and light-headedness.   All other systems reviewed and are negative.      Objective   Constitutional:       Appearance: Healthy appearance. Not in distress.   Eyes:      Conjunctiva/sclera: Conjunctivae normal.   HENT:      Nose: Nose normal.    Mouth/Throat:      Pharynx: Oropharynx is clear.   Pulmonary:      Effort: Pulmonary effort is normal.      Breath sounds: Normal breath sounds. No wheezing. No rhonchi.   Chest:      Chest wall: Not tender to palpatation.   Cardiovascular:      Normal rate. Regular rhythm.      Murmurs: There is no murmur.      No rub.   Pulses:     Intact distal pulses.   Edema:     Peripheral edema absent.   Abdominal:      General: Bowel sounds are normal.      Palpations: Abdomen is soft.   Musculoskeletal: Normal range of motion.      Cervical back: Neck supple. Skin:     General: Skin is warm and dry.      Comments: Right groin site well approximated, no signs of bruising/bleeding/swelling/pain   Neurological:      Mental Status: Alert and oriented to person, place and time.      Motor: Motor function is intact.       Assessment/Plan   The encounter diagnosis was Persistent atrial fibrillation (CMS/HCC).  Patient maintaining normal sinus rhythm now 1 month post A-fib ablation.  He is feeling well and has a lot of questions regarding his plan of care in the future.  His [id number] score equals 0, he can stop his blood thinner 3 months post ablation.  I do not recommend stopping it before then due to increased risk of stroke during this  healing time.  Typically we continue antiarrhythmic drugs up to 6 months post ablation before trying to stop it.  We discussed lifestyle modifications that can help maintain normal sinus rhythm such as exercise, weight loss, managing hypertension, treating sleep apnea, and minimizing alcohol intake.  All questions asked and answered.    Follow-up with me in 3 to 4 months, or sooner as needed  Continue dofetilide and Xarelto

## 2023-12-18 ENCOUNTER — HOSPITAL ENCOUNTER (OUTPATIENT)
Dept: RADIOLOGY | Facility: HOSPITAL | Age: 63
Discharge: HOME | End: 2023-12-18
Payer: COMMERCIAL

## 2023-12-18 DIAGNOSIS — R97.20 ELEVATED PSA: ICD-10-CM

## 2023-12-18 LAB
ATRIAL RATE: 77 BPM
P AXIS: 63 DEGREES
P OFFSET: 187 MS
P ONSET: 126 MS
PR INTERVAL: 170 MS
Q ONSET: 211 MS
QRS COUNT: 13 BEATS
QRS DURATION: 88 MS
QT INTERVAL: 388 MS
QTC CALCULATION(BAZETT): 439 MS
QTC FREDERICIA: 421 MS
R AXIS: -17 DEGREES
T AXIS: 53 DEGREES
T OFFSET: 405 MS
VENTRICULAR RATE: 77 BPM

## 2023-12-18 PROCEDURE — 6100000002 HC SELF-PAY SCREENING FAST MRI PELVIC

## 2023-12-28 ENCOUNTER — HOSPITAL ENCOUNTER (OUTPATIENT)
Dept: RADIOLOGY | Facility: HOSPITAL | Age: 63
Discharge: HOME | End: 2023-12-28
Payer: COMMERCIAL

## 2023-12-28 ENCOUNTER — PROCEDURE VISIT (OUTPATIENT)
Dept: UROLOGY | Facility: HOSPITAL | Age: 63
End: 2023-12-28
Payer: COMMERCIAL

## 2023-12-28 VITALS — HEART RATE: 95 BPM | SYSTOLIC BLOOD PRESSURE: 140 MMHG | DIASTOLIC BLOOD PRESSURE: 97 MMHG

## 2023-12-28 DIAGNOSIS — R97.20 ELEVATED PSA: Primary | ICD-10-CM

## 2023-12-28 DIAGNOSIS — N40.0 BENIGN PROSTATIC HYPERPLASIA WITHOUT URINARY OBSTRUCTION: ICD-10-CM

## 2023-12-28 DIAGNOSIS — R10.31 RIGHT LOWER QUADRANT PAIN: ICD-10-CM

## 2023-12-28 DIAGNOSIS — Z86.010 PERSONAL HISTORY OF COLONIC POLYPS: ICD-10-CM

## 2023-12-28 LAB
POC APPEARANCE, URINE: CLEAR
POC BILIRUBIN, URINE: NEGATIVE
POC BLOOD, URINE: ABNORMAL
POC COLOR, URINE: YELLOW
POC GLUCOSE, URINE: NEGATIVE MG/DL
POC KETONES, URINE: ABNORMAL MG/DL
POC LEUKOCYTES, URINE: NEGATIVE
POC NITRITE,URINE: NEGATIVE
POC PH, URINE: 5.5 PH
POC PROTEIN, URINE: ABNORMAL MG/DL
POC SPECIFIC GRAVITY, URINE: >=1.03
POC UROBILINOGEN, URINE: 0.2 EU/DL

## 2023-12-28 PROCEDURE — 81003 URINALYSIS AUTO W/O SCOPE: CPT | Performed by: UROLOGY

## 2023-12-28 PROCEDURE — 74261 CT COLONOGRAPHY DX: CPT | Performed by: RADIOLOGY

## 2023-12-28 PROCEDURE — 51741 ELECTRO-UROFLOWMETRY FIRST: CPT | Performed by: UROLOGY

## 2023-12-28 PROCEDURE — 52000 CYSTOURETHROSCOPY: CPT | Performed by: UROLOGY

## 2023-12-28 PROCEDURE — 99214 OFFICE O/P EST MOD 30 MIN: CPT | Mod: 95 | Performed by: UROLOGY

## 2023-12-28 PROCEDURE — 74261 CT COLONOGRAPHY DX: CPT

## 2023-12-28 PROCEDURE — 99214 OFFICE O/P EST MOD 30 MIN: CPT | Performed by: UROLOGY

## 2023-12-28 ASSESSMENT — PAIN SCALES - GENERAL: PAINLEVEL: 0-NO PAIN

## 2023-12-28 NOTE — PROGRESS NOTES
FUV    Last seen - 12/1/23     HISTORY OF PRESENT ILLNESS:   Moiz Reynoso is a 63 y.o. male who is being seen today for fuv.    Pt with h/o HLD, CAD, KITTY, Obesity, AF s/p ablation     Pt reports around 2010 was told he had an enlarged prostate.  Has had elevated PSA.  MRI in 2017, prostate approx 85cc, no high risk lesions.  Most recent PSA in our system 11.4 (Dec 2021).       Pt with urinary urgency, some urge incontinence, hesitency, weak stream, incomplete emptying. Occasional dysuria.      MRI Prostate (12/18/23) - 145g, PSAD 0.1.  PIRADS2    PAST MEDICAL HISTORY:  Past Medical History:   Diagnosis Date    Atrial fibrillation (CMS/HCC)     Personal history of other diseases of the circulatory system     History of atrial fibrillation    Personal history of other diseases of the nervous system and sense organs     History of sleep apnea       PAST SURGICAL HISTORY:  Past Surgical History:   Procedure Laterality Date    CARDIAC ELECTROPHYSIOLOGY PROCEDURE N/A 11/16/2023    Procedure: Ablation A-Fib Persistant;  Surgeon: Nico Hay MD;  Location: [place] [address] [facility];  Service: Electrophysiology;  Laterality: N/A;  SDA/RFA/ AFIB/ CARTO/ GA whole    OTHER SURGICAL HISTORY  11/24/2021    Meniscus repair        ALLERGIES:   Allergies   Allergen Reactions    Latex Itching and Rash        MEDICATIONS:   Current Outpatient Medications   Medication Instructions    dofetilide (Tikosyn) 250 mcg capsule TAKE 1 CAPSULE BY MOUTH EVERY 12 HOURS.    rivaroxaban (XARELTO) 20 mg, oral, Daily with evening meal        PHYSICAL EXAM:  There were no vitals taken for this visit.  Constitutional: Patient appears well-developed and well-nourished. No distress.    Pulmonary/Chest: Effort normal. No respiratory distress.   Neurological: Alert and oriented to person, place, and time.  Psychiatric: Normal mood and affect. Behavior is normal. Thought content normal.      Labs  Lab Results   Component Value Date    PSA  14.5 (H) 12/01/2023     Lab Results   Component Value Date    GFRMALE >90 09/08/2023     Lab Results   Component Value Date    CREATININE 0.86 11/17/2023     Lab Results   Component Value Date    TESTF 58.0 12/10/2021     Lab Results   Component Value Date    HCT 45.0 09/05/2023       Imaging  MRI  PROSTATE VOLUME:  The prostate measures 6.8 cm x 6.1 cm  x 6.7 cm in right-to-left,  anterior-posterior and craniocaudal dimension.      Prostate weight is estimated at 145.2g. PSA density is 0.10 ng/mL/g.      PROSTATE PARENCHYMA:  There is heterogeneous enlargement of the transition zone, consistent  with benign prostatic hyperplasia. The peripheral zone is diffusely  heterogenous on T2WI, with bilateral polygonal and wedge-shaped  T2-hypointense areas, that show no signs of abnormally restricted  diffusion (PI-RADS 2).      EXTRACAPSULAR EXTENSION:  None.      SEMINAL VESICLES:  Within normal limits.      PELVIC LYMPH NODES:  No abnormally enlarged pelvic lymph nodes are identified.      PERITONEUM:  No free or loculated fluid collections are evident in the pelvis.      OTHER ORGANS:  Mild thickening and trabeculation of the bladder wall which may be  secondary to underdistention and/or sequelae of chronic bladder  outlet obstruction. There is a 0.4 cm T2 hypointense focus in the  posterior bladder which may represent dependently layering stone  versus trabeculation.      BONES:  No focal lesions are noted in the bone.      IMPRESSION:  BPH changes of the transition zone. Diffuse non nodular  hypointensities within the peripheral zone, without evidence of  focally restricted diffusion ( PI-RADS 2).    Procedure:  After informed consent was obtained, the patient was taken to the procedure room for cystoscopy due to BPH/LUTS.     Cystoscopy     Procedure Note:    A sterile prep and drape was performed in standard fashion. Lidocaine was used for topical anesthesia. A flexible cystoscope was inserted into the urethra  without difficulty revealing normal urethra.     The prostate lateral lobe enlargement, no significant intravesicular median lobe     Then entered the bladder revealing bladder mucosa with no erythematous patches or plaques, foreign bodies or papillary lesions. Tiny stone in bladder. The ureteral orifices were visualized bilaterally. These were orthotopic in location and effluxing clear urine. No masses were seen on retroflexion.     Post-Procedure:   The cystoscope was removed. The vital signs were stable . The patient tolerated the procedure well. There were no complications.      Complex Uroflow  Max flow: 10.1 ml/s  Avg flow: 5.0 ml/s  Voiding time: 23.7 s  Flow time: 23.0 s  Time to max flow: 12.1 s  Voided volume: 116 ml  Flow pattern: bell    PVR by bladder scan: 0 mL       Assessment:      1. Elevated PSA        2. Benign prostatic hyperplasia without urinary obstruction            Moiz Reynoso is a 63 y.o. male here for FUV     Plan:   1)  Elevated PSA  -Likely related to his enlarged prostate, reassuring MRI    2) LUTS  -145g gland  -Qmax 10.1 ml/s, PVR 0  -Cystoscopy showed lateral lobe enlargement, no intravesicular lobe  -Discussed measures as below    FU in 3-6m, sooner if needed    OAB    We discussed the pathophysiology of overactive bladder. We discussed possible treatment options including doing conservative voiding techniques, medications, and surgical options.. He was counseled regarding bladder retraining, diet choices, and fluid restriction.  A handout about this given as well as a voiding diary    Patient was informed that first line treatment is behavioral therapy.  This includes:   -Fluid balancing and sometimes restriction   -Reducing caffeine or other bladder stimulants   -Bladder retraining  -Delayed voiding to help defer the overwhelming urge    Patient was informed that second line treatment includes medications. We discussed Mirabegron and that the side effects include possible  increase in blood pressure in a small minority of patients, however insurance does not always cover this. We also discussed anticholinergic medications which can have the side effects of dry eyes, dry mouth, constipation and rarely cognitive changes.    I mentioned 3rd line management options of neuromodulation and Botox injections as well     Lower Urinary Tract Symptoms (LUTS)    I educated the patient on relevant lower urinary tract anatomy and physiology with emphasis on differential diagnosis as well as contributing factors to the patient's lower urinary tract symptoms. I educated the patient on dietary and behavioral modifications pertinent to the patient's complaints. I recommended to avoid caffeine, alcohol, spicy and acidic oral intake and to regulate fluid intake and voiding (timed voiding, double voiding). I stressed the importance of avoiding constipation and recommended stool softeners unless diarrhea present. We discussed limiting fluid intake at night and elevating legs prior to bedtime.     The mechanism of action as well as the risks, benefits, common side effects, and alternatives to all prescribed medications were discussed with the patient at length. The patient had the opportunity to ask questions and all questions were answered. I primarily discussed alpha blockers, 5ARIs, PDE5i, anticholinergics, and beta 3 agonist (mirabegron).  I explained that alpha blockers help decrease bladder outlet resistance with potential side effects to include retrograde ejaculation, rhinitis, and light headedness. I explained that 5 alpha reductase inhibitors can shrink the prostate up to 30%, can artificially decrease their PSA value by 50%, but take approximately 6-9 months to reach full efficacy and have potential side effects to include decreased libido, impotence and breast tenderness.

## 2023-12-29 ENCOUNTER — APPOINTMENT (OUTPATIENT)
Dept: UROLOGY | Facility: HOSPITAL | Age: 63
End: 2023-12-29
Payer: COMMERCIAL

## 2024-04-25 ENCOUNTER — OFFICE VISIT (OUTPATIENT)
Dept: CARDIOLOGY | Facility: CLINIC | Age: 64
End: 2024-04-25
Payer: COMMERCIAL

## 2024-04-25 VITALS
HEART RATE: 81 BPM | SYSTOLIC BLOOD PRESSURE: 130 MMHG | TEMPERATURE: 97.9 F | BODY MASS INDEX: 34.51 KG/M2 | DIASTOLIC BLOOD PRESSURE: 86 MMHG | HEIGHT: 69 IN | WEIGHT: 233 LBS

## 2024-04-25 DIAGNOSIS — I48.19 PERSISTENT ATRIAL FIBRILLATION (MULTI): ICD-10-CM

## 2024-04-25 PROCEDURE — 3008F BODY MASS INDEX DOCD: CPT | Performed by: NURSE PRACTITIONER

## 2024-04-25 PROCEDURE — 99214 OFFICE O/P EST MOD 30 MIN: CPT | Performed by: NURSE PRACTITIONER

## 2024-04-25 PROCEDURE — 93005 ELECTROCARDIOGRAM TRACING: CPT | Performed by: NURSE PRACTITIONER

## 2024-04-25 PROCEDURE — 1036F TOBACCO NON-USER: CPT | Performed by: NURSE PRACTITIONER

## 2024-04-26 ASSESSMENT — ENCOUNTER SYMPTOMS
DIARRHEA: 0
LIGHT-HEADEDNESS: 0
FALLS: 0
PND: 0
MYALGIAS: 0
DYSPNEA ON EXERTION: 0
DOUBLE VISION: 0
ORTHOPNEA: 0
SPUTUM PRODUCTION: 0
FEVER: 0
IRREGULAR HEARTBEAT: 0
BLURRED VISION: 0
HEMOPTYSIS: 0
NEAR-SYNCOPE: 0
SORE THROAT: 0
NAUSEA: 0
HEADACHES: 0
COUGH: 0
VOMITING: 0
SNORING: 0
ABDOMINAL PAIN: 0
DIAPHORESIS: 0

## 2024-04-26 NOTE — PROGRESS NOTES
"Subjective   Moiz Reynoso is a 63 y.o. male.    Chief Complaint:  Atrial Fibrillation    Moiz Reynoso is a 61 y/o male presenting for 6 month follow up post Afib/Aflutter ablation.    Pt was initially referred by Dr Ospina for evaluation of AF.   PMH includes BPH, depression, DLD, KITTY (CPAP), and AF/AFL.   Treatment of his FA includes metoprolol & digoxin.   Symptoms of his AF include YOUNG, rapid HR's, and fatigue.     He underwent dofetilide initiation 9/5/2023 - 9/8/2023.    ECG 10/16/2023: Normal sinus rhythm, 76 bpm, left axis deviation, QTc 445 ms    Stress Echo 11/17/21: EF 59%, negative for ischemia  Holter monitor 11/2021: persistent AF w/ avg HR of 111 bpm    Now s/p Afib and CTI RFA with Dr. Hay 11/16/2023  ECG 12/14/2023 NSR HR 77 bpm, Qtc 439 ms  ECG 4/25/24: NSR HR 78 bpm, left axis deviation, Qtc 442 ms    TODAY Patient presents for 6 month follow up post Afib ablation.  He denies any recurrence of his Afib since our last visit.  He has gained some weight since his last visit, but otherwise he is feeling good.    /86 (BP Location: Right arm)   Pulse 81   Temp 36.6 °C (97.9 °F)   Ht 1.74 m (5' 8.5\")   Wt 106 kg (233 lb)   BMI 34.91 kg/m²     Current Outpatient Medications on File Prior to Visit   Medication Sig Dispense Refill    [DISCONTINUED] dofetilide (Tikosyn) 250 mcg capsule TAKE 1 CAPSULE BY MOUTH EVERY 12 HOURS. 180 capsule 1    [DISCONTINUED] rivaroxaban (Xarelto) 20 mg tablet Take 1 tablet (20 mg) by mouth once daily in the evening. Take with meals. 90 tablet 0     No current facility-administered medications on file prior to visit.         Review of Systems   Constitutional: Negative for diaphoresis, fever and malaise/fatigue.   HENT:  Negative for congestion and sore throat.    Eyes:  Negative for blurred vision and double vision.   Cardiovascular:  Negative for dyspnea on exertion, irregular heartbeat, leg swelling, near-syncope, orthopnea and paroxysmal nocturnal " dyspnea.   Respiratory:  Negative for cough, hemoptysis, snoring and sputum production.    Hematologic/Lymphatic: Negative for bleeding problem.   Skin:  Negative for rash.   Musculoskeletal:  Negative for falls, joint pain and myalgias.   Gastrointestinal:  Negative for abdominal pain, diarrhea, nausea and vomiting.   Neurological:  Negative for headaches and light-headedness.   All other systems reviewed and are negative.      Objective   Constitutional:       Appearance: Healthy appearance. Not in distress.   Eyes:      Conjunctiva/sclera: Conjunctivae normal.   HENT:      Nose: Nose normal.    Mouth/Throat:      Pharynx: Oropharynx is clear.   Pulmonary:      Effort: Pulmonary effort is normal.      Breath sounds: Normal breath sounds. No wheezing. No rhonchi.   Chest:      Chest wall: Not tender to palpatation.   Cardiovascular:      Normal rate. Regular rhythm.      Murmurs: There is no murmur.      No rub.   Pulses:     Intact distal pulses.   Edema:     Peripheral edema absent.   Abdominal:      General: Bowel sounds are normal.      Palpations: Abdomen is soft.   Musculoskeletal: Normal range of motion.      Cervical back: Neck supple. Skin:     General: Skin is warm and dry.   Neurological:      Mental Status: Alert and oriented to person, place and time.      Motor: Motor function is intact.       Assessment/Plan   The encounter diagnosis was Persistent atrial fibrillation (Multi).  Patient maintaining NSR, now 6 months post ablation.  We discussed lifestyle modifications that can help maintain normal sinus rhythm such as exercise, weight loss, managing hypertension, treating sleep apnea, and minimizing alcohol intake.  All questions asked and answered.  CHADSVASC score =0    Stop Dofetilide and Xarelto  Follow up with me 1 year post procedure or sooner as needed

## 2024-05-13 LAB
ATRIAL RATE: 78 BPM
P AXIS: 55 DEGREES
P OFFSET: 183 MS
P ONSET: 130 MS
PR INTERVAL: 164 MS
Q ONSET: 212 MS
QRS COUNT: 13 BEATS
QRS DURATION: 92 MS
QT INTERVAL: 388 MS
QTC CALCULATION(BAZETT): 442 MS
QTC FREDERICIA: 423 MS
R AXIS: -36 DEGREES
T AXIS: 41 DEGREES
T OFFSET: 406 MS
VENTRICULAR RATE: 78 BPM

## 2024-05-30 ENCOUNTER — APPOINTMENT (OUTPATIENT)
Dept: PRIMARY CARE | Facility: CLINIC | Age: 64
End: 2024-05-30
Payer: COMMERCIAL

## 2024-06-27 ENCOUNTER — APPOINTMENT (OUTPATIENT)
Dept: UROLOGY | Facility: HOSPITAL | Age: 64
End: 2024-06-27
Payer: COMMERCIAL

## 2024-09-19 ENCOUNTER — APPOINTMENT (OUTPATIENT)
Dept: PRIMARY CARE | Facility: CLINIC | Age: 64
End: 2024-09-19
Payer: COMMERCIAL

## 2024-11-19 ENCOUNTER — OFFICE VISIT (OUTPATIENT)
Dept: CARDIOLOGY | Facility: CLINIC | Age: 64
End: 2024-11-19
Payer: COMMERCIAL

## 2024-11-19 VITALS
HEART RATE: 92 BPM | BODY MASS INDEX: 32.96 KG/M2 | SYSTOLIC BLOOD PRESSURE: 134 MMHG | DIASTOLIC BLOOD PRESSURE: 81 MMHG | WEIGHT: 220 LBS | OXYGEN SATURATION: 98 %

## 2024-11-19 DIAGNOSIS — I48.19 PERSISTENT ATRIAL FIBRILLATION (MULTI): Primary | ICD-10-CM

## 2024-11-19 LAB
ATRIAL RATE: 90 BPM
P AXIS: 69 DEGREES
P OFFSET: 189 MS
P ONSET: 133 MS
PR INTERVAL: 154 MS
Q ONSET: 210 MS
QRS COUNT: 15 BEATS
QRS DURATION: 92 MS
QT INTERVAL: 374 MS
QTC CALCULATION(BAZETT): 457 MS
QTC FREDERICIA: 428 MS
R AXIS: -14 DEGREES
T AXIS: 55 DEGREES
T OFFSET: 397 MS
VENTRICULAR RATE: 90 BPM

## 2024-11-19 PROCEDURE — 99214 OFFICE O/P EST MOD 30 MIN: CPT | Performed by: NURSE PRACTITIONER

## 2024-11-19 PROCEDURE — 1036F TOBACCO NON-USER: CPT | Performed by: NURSE PRACTITIONER

## 2024-11-19 PROCEDURE — 93005 ELECTROCARDIOGRAM TRACING: CPT | Performed by: NURSE PRACTITIONER

## 2024-11-21 ENCOUNTER — APPOINTMENT (OUTPATIENT)
Dept: CARDIOLOGY | Facility: CLINIC | Age: 64
End: 2024-11-21
Payer: COMMERCIAL

## 2024-11-25 ASSESSMENT — ENCOUNTER SYMPTOMS
HEMOPTYSIS: 0
DOUBLE VISION: 0
SPUTUM PRODUCTION: 0
HEADACHES: 0
NAUSEA: 0
SORE THROAT: 0
FEVER: 0
FALLS: 0
VOMITING: 0
ORTHOPNEA: 0
SNORING: 0
PND: 0
NEAR-SYNCOPE: 0
MYALGIAS: 0
LIGHT-HEADEDNESS: 0
ABDOMINAL PAIN: 0
IRREGULAR HEARTBEAT: 0
DIAPHORESIS: 0
BLURRED VISION: 0
COUGH: 0
DIARRHEA: 0
DYSPNEA ON EXERTION: 0

## 2024-11-26 NOTE — PROGRESS NOTES
Subjective   Moiz Reynoso is a 64 y.o. male.    Chief Complaint:  Atrial Fibrillation    Moiz Reynoso is a 65 y/o male presenting for 1 year follow up post Afib/Aflutter ablation.    Pt was initially referred by Dr Ospina for evaluation of AF.   PMH includes BPH, depression, DLD, KITTY (CPAP), and AF/AFL.   Treatment of his FA includes metoprolol & digoxin.   Symptoms of his AF include YOUNG, rapid HR's, and fatigue.     He underwent dofetilide initiation 9/5/2023 - 9/8/2023.    ECG 10/16/2023: Normal sinus rhythm, 76 bpm, left axis deviation, QTc 445 ms    Stress Echo 11/17/21: EF 59%, negative for ischemia  Holter monitor 11/2021: persistent AF w/ avg HR of 111 bpm    Now s/p Afib and CTI RFA with Dr. Hay 11/16/2023  ECG 12/14/2023 NSR HR 77 bpm, Qtc 439 ms  ECG 4/25/24: NSR HR 78 bpm, left axis deviation, Qtc 442 ms  Dofetilide and Xarelto were stopped  ECG 11/19/2024 NSR HR 90 bpm    TODAY Patient presents for follow up, now 1 year post Afib/Aflutter ablation.  He is feeling good and denies any cardiac or arrhythmia symptoms.    /81 (BP Location: Right arm, Patient Position: Sitting)   Pulse 92   Wt 99.8 kg (220 lb)   SpO2 98%   BMI 32.96 kg/m²     No current outpatient medications on file prior to visit.     No current facility-administered medications on file prior to visit.         Review of Systems   Constitutional: Negative for diaphoresis, fever and malaise/fatigue.   HENT:  Negative for congestion and sore throat.    Eyes:  Negative for blurred vision and double vision.   Cardiovascular:  Negative for dyspnea on exertion, irregular heartbeat, leg swelling, near-syncope, orthopnea and paroxysmal nocturnal dyspnea.   Respiratory:  Negative for cough, hemoptysis, snoring and sputum production.    Hematologic/Lymphatic: Negative for bleeding problem.   Skin:  Negative for rash.   Musculoskeletal:  Negative for falls, joint pain and myalgias.   Gastrointestinal:  Negative for abdominal pain,  diarrhea, nausea and vomiting.   Neurological:  Negative for headaches and light-headedness.   All other systems reviewed and are negative.      Objective   Constitutional:       Appearance: Healthy appearance. Not in distress.   Eyes:      Conjunctiva/sclera: Conjunctivae normal.   HENT:      Nose: Nose normal.    Mouth/Throat:      Pharynx: Oropharynx is clear.   Pulmonary:      Effort: Pulmonary effort is normal.      Breath sounds: Normal breath sounds. No wheezing. No rhonchi.   Chest:      Chest wall: Not tender to palpatation.   Cardiovascular:      Normal rate. Regular rhythm.      Murmurs: There is no murmur.      No rub.   Pulses:     Intact distal pulses.   Edema:     Peripheral edema absent.   Abdominal:      General: Bowel sounds are normal.      Palpations: Abdomen is soft.   Musculoskeletal: Normal range of motion.      Cervical back: Neck supple. Skin:     General: Skin is warm and dry.   Neurological:      Mental Status: Alert and oriented to person, place and time.      Motor: Motor function is intact.       Assessment/Plan   The encounter diagnosis was Persistent atrial fibrillation (Multi).  Patient maintaining NSR, now 1 year post ablation.  We discussed lifestyle modifications that can help maintain normal sinus rhythm such as exercise, weight loss, managing hypertension, treating sleep apnea, and minimizing alcohol intake.  All questions asked and answered.  CHADSVASC score =0    Patient no longer on any cardiac medications  Follow up with PCP  Follow up with EP as needed

## 2024-12-31 ENCOUNTER — APPOINTMENT (OUTPATIENT)
Dept: PRIMARY CARE | Facility: CLINIC | Age: 64
End: 2024-12-31
Payer: COMMERCIAL

## 2024-12-31 VITALS
HEIGHT: 69 IN | OXYGEN SATURATION: 98 % | WEIGHT: 223 LBS | DIASTOLIC BLOOD PRESSURE: 62 MMHG | TEMPERATURE: 97.6 F | HEART RATE: 81 BPM | BODY MASS INDEX: 33.03 KG/M2 | SYSTOLIC BLOOD PRESSURE: 128 MMHG

## 2024-12-31 DIAGNOSIS — I48.0 PAROXYSMAL ATRIAL FIBRILLATION (MULTI): ICD-10-CM

## 2024-12-31 DIAGNOSIS — E78.2 MIXED HYPERLIPIDEMIA: ICD-10-CM

## 2024-12-31 DIAGNOSIS — N40.0 BENIGN PROSTATIC HYPERPLASIA WITHOUT URINARY OBSTRUCTION: ICD-10-CM

## 2024-12-31 DIAGNOSIS — R93.1 ELEVATED CORONARY ARTERY CALCIUM SCORE: ICD-10-CM

## 2024-12-31 DIAGNOSIS — E78.5 DYSLIPIDEMIA: ICD-10-CM

## 2024-12-31 DIAGNOSIS — Z00.00 WELL ADULT EXAM: Primary | ICD-10-CM

## 2024-12-31 DIAGNOSIS — G47.33 OSA ON CPAP: ICD-10-CM

## 2024-12-31 DIAGNOSIS — Z86.16 PERSONAL HISTORY OF COVID-19: ICD-10-CM

## 2024-12-31 LAB — POC HEMOGLOBIN A1C: 5.6 % (ref 4.2–6.5)

## 2024-12-31 PROCEDURE — 83036 HEMOGLOBIN GLYCOSYLATED A1C: CPT | Performed by: FAMILY MEDICINE

## 2024-12-31 PROCEDURE — 3008F BODY MASS INDEX DOCD: CPT | Performed by: FAMILY MEDICINE

## 2024-12-31 PROCEDURE — 1036F TOBACCO NON-USER: CPT | Performed by: FAMILY MEDICINE

## 2024-12-31 PROCEDURE — 99396 PREV VISIT EST AGE 40-64: CPT | Performed by: FAMILY MEDICINE

## 2024-12-31 ASSESSMENT — PATIENT HEALTH QUESTIONNAIRE - PHQ9
SUM OF ALL RESPONSES TO PHQ9 QUESTIONS 1 AND 2: 0
2. FEELING DOWN, DEPRESSED OR HOPELESS: NOT AT ALL
1. LITTLE INTEREST OR PLEASURE IN DOING THINGS: NOT AT ALL

## 2024-12-31 NOTE — PROGRESS NOTES
Subjective   Patient ID: Moiz Reynoso is a 64 y.o. male who presents for Annual Exam (Thinks he might have a hernia right side of stomach above groin. ).  HPI  Concerns today: Right groin pain    In general the patient states that his health is: good    Regular dental visits: Regular and no issues  Vision problems: No recent changes  Hearing loss:No cahnges    Diet: Limit sugary drinks.  Eating out a lot as renovating house.   Exercise: No exercise.  Will walk occasionally.   Weight concerns: yes  Sleep: wakes several times and has to urinate. Using CPAP.  Machine is 10 years.    Caffeine:  cup of coffee in the AM.   Water: Increasing    Tobacco use: none  Alcohol use:none  Illicit drug use: none    Colon cancer screenin2023 with CT colonography  :  Review of Systems    Objective   Physical Exam  General: Patient is alert and oriented ×3 and appears in no acute distress. No respiratory distress.    Head: Atraumatic normocephalic.    Eyes: EOMI, PERRLA      Ears: Canals patent without any irritation, tympanic membranes without inflammation, no swelling, normal light reflex.    Nose: Nares patent. Turbinates are not swollen. No discharge.    Mouth: Normal mucosa. Moist. No erythema, exudates, tonsillar enlargement.    Neck: Normal range of motion, no masses.  Thyroid is palpable and normal in size without any nodules. No anterior cervical or posterior cervical adenopathy.    Heart: Regular rate and rhythm, no murmurs clicks or gallops    Lungs: Clear to auscultation bilaterally without any rhonchi rales or wheezing, lung sounds heard throughout all lung fields    Abdomen: Soft, nontender, no rigidity, rebound, guarding or organomegaly. Bowel sounds ×4 quadrants.    Musculoskeletal: Normal range of motion, strength is grossly intact in the proximal distal muscles of the upper and lower extremities bilaterally, deep tendon reflexes +2 out of 4 and symmetric bilaterally at the patella, Achilles, biceps,  triceps, sensation intact.    Nerves: Cranial nerves II through XII appear grossly intact and without deficit    Skin: Intact, dry, no rashes or erythema    Psych: Normal affect.  Assessment/Plan   Problem List Items Addressed This Visit       Atrial fibrillation (Multi)    Benign prostatic hyperplasia without urinary obstruction    Relevant Orders    PSA    Elevated coronary artery calcium score    Mixed hyperlipidemia    Dyslipidemia    KITTY on CPAP    Personal history of COVID-19     Other Visit Diagnoses       Well adult exam    -  Primary    Relevant Orders    POCT glycosylated hemoglobin (Hb A1C) manually resulted    CBC and Auto Differential    Comprehensive Metabolic Panel    Lipid Panel    Vitamin B12    PSA        Prediabetes  - HBA1C was 5.6    Atrial fibrillation  - Had ablation 1 year ago and maintaining NSR  - Follows with cardiology     Dyslipidemia  Hyperlipidemia and elevated coronary artery calcium score  Started atorvastatin 10 mg 1 p.o. daily     Fatigue  Labs were ordered     Benign prostatic hypertrophy  - Following up with urologist         KITTY on CPAP- controlled

## 2025-01-29 LAB
ALBUMIN SERPL-MCNC: 4.6 G/DL (ref 3.6–5.1)
ALP SERPL-CCNC: 54 U/L (ref 35–144)
ALT SERPL-CCNC: 29 U/L (ref 9–46)
ANION GAP SERPL CALCULATED.4IONS-SCNC: 13 MMOL/L (CALC) (ref 7–17)
AST SERPL-CCNC: 45 U/L (ref 10–35)
BASOPHILS # BLD AUTO: 41 CELLS/UL (ref 0–200)
BASOPHILS NFR BLD AUTO: 0.5 %
BILIRUB SERPL-MCNC: 0.9 MG/DL (ref 0.2–1.2)
BUN SERPL-MCNC: 13 MG/DL (ref 7–25)
CALCIUM SERPL-MCNC: 9.1 MG/DL (ref 8.6–10.3)
CHLORIDE SERPL-SCNC: 100 MMOL/L (ref 98–110)
CHOLEST SERPL-MCNC: 246 MG/DL
CHOLEST/HDLC SERPL: 5.6 (CALC)
CO2 SERPL-SCNC: 26 MMOL/L (ref 20–32)
CREAT SERPL-MCNC: 0.92 MG/DL (ref 0.7–1.35)
EGFRCR SERPLBLD CKD-EPI 2021: 93 ML/MIN/1.73M2
EOSINOPHIL # BLD AUTO: 287 CELLS/UL (ref 15–500)
EOSINOPHIL NFR BLD AUTO: 3.5 %
ERYTHROCYTE [DISTWIDTH] IN BLOOD BY AUTOMATED COUNT: 13 % (ref 11–15)
GLUCOSE SERPL-MCNC: 101 MG/DL (ref 65–99)
HCT VFR BLD AUTO: 49.4 % (ref 38.5–50)
HDLC SERPL-MCNC: 44 MG/DL
HGB BLD-MCNC: 16.4 G/DL (ref 13.2–17.1)
LDLC SERPL CALC-MCNC: 158 MG/DL (CALC)
LYMPHOCYTES # BLD AUTO: 2247 CELLS/UL (ref 850–3900)
LYMPHOCYTES NFR BLD AUTO: 27.4 %
MCH RBC QN AUTO: 29 PG (ref 27–33)
MCHC RBC AUTO-ENTMCNC: 33.2 G/DL (ref 32–36)
MCV RBC AUTO: 87.4 FL (ref 80–100)
MONOCYTES # BLD AUTO: 582 CELLS/UL (ref 200–950)
MONOCYTES NFR BLD AUTO: 7.1 %
NEUTROPHILS # BLD AUTO: 5043 CELLS/UL (ref 1500–7800)
NEUTROPHILS NFR BLD AUTO: 61.5 %
NONHDLC SERPL-MCNC: 202 MG/DL (CALC)
PLATELET # BLD AUTO: 193 THOUSAND/UL (ref 140–400)
PMV BLD REES-ECKER: 11.2 FL (ref 7.5–12.5)
POTASSIUM SERPL-SCNC: 4.5 MMOL/L (ref 3.5–5.3)
PROT SERPL-MCNC: 7.3 G/DL (ref 6.1–8.1)
PSA SERPL-MCNC: 11.35 NG/ML
RBC # BLD AUTO: 5.65 MILLION/UL (ref 4.2–5.8)
SODIUM SERPL-SCNC: 139 MMOL/L (ref 135–146)
TRIGL SERPL-MCNC: 267 MG/DL
VIT B12 SERPL-MCNC: 538 PG/ML (ref 200–1100)
WBC # BLD AUTO: 8.2 THOUSAND/UL (ref 3.8–10.8)

## 2025-02-10 ENCOUNTER — TELEPHONE (OUTPATIENT)
Dept: PRIMARY CARE | Facility: CLINIC | Age: 65
End: 2025-02-10
Payer: COMMERCIAL

## 2025-02-10 DIAGNOSIS — R93.1 ELEVATED CORONARY ARTERY CALCIUM SCORE: Primary | ICD-10-CM

## 2025-02-10 DIAGNOSIS — E78.2 MIXED HYPERLIPIDEMIA: ICD-10-CM

## 2025-02-10 DIAGNOSIS — R97.20 ELEVATED PSA: ICD-10-CM

## 2025-02-10 DIAGNOSIS — N40.0 BENIGN PROSTATIC HYPERPLASIA WITHOUT URINARY OBSTRUCTION: ICD-10-CM

## 2025-02-10 NOTE — TELEPHONE ENCOUNTER
I called Moiz and let him know his results and what Dr. Molina suggested. He is going to try it on his own and do diet and exercise and is making an appt for May. He wants to have some bloodwork done for May to see if his diet and exercise is working, if not he will discuss with you about medications. Can you order bloodwork for him please?

## 2025-02-10 NOTE — TELEPHONE ENCOUNTER
----- Message from Phillip Molina sent at 2/5/2025  9:10 PM EST -----  Please let the patient know that we need to follow-up on his cholesterol which is significantly elevated with an LDL of 158, total cholesterol 246, triglycerides 267.The patient know that his his glucose was in the prediabetic range at 101.  Kidney function was normal.  Liver function slightly elevated with an AST of 45.  PSA was elevated at 11.35.  Blood count normal.  Vitamin B12 normal.

## 2025-05-19 ENCOUNTER — APPOINTMENT (OUTPATIENT)
Dept: PRIMARY CARE | Facility: CLINIC | Age: 65
End: 2025-05-19
Payer: COMMERCIAL

## 2025-06-05 ENCOUNTER — APPOINTMENT (OUTPATIENT)
Dept: PRIMARY CARE | Facility: CLINIC | Age: 65
End: 2025-06-05
Payer: COMMERCIAL

## 2025-12-30 ENCOUNTER — APPOINTMENT (OUTPATIENT)
Dept: PRIMARY CARE | Facility: CLINIC | Age: 65
End: 2025-12-30
Payer: COMMERCIAL

## 2025-12-31 ENCOUNTER — APPOINTMENT (OUTPATIENT)
Dept: PRIMARY CARE | Facility: CLINIC | Age: 65
End: 2025-12-31
Payer: COMMERCIAL

## (undated) DEVICE — CATHETER, TACTIFLEX, BI-D ABLATION, 8FR 2-2-2MM D-F CURVE

## (undated) DEVICE — INTRODUCER, HEMOSTASIS, STR/J .038 IN, 8.5FR 12CM

## (undated) DEVICE — CATHETER, ADVISOR HD GRID MAPPING, SENSOR ENABLED

## (undated) DEVICE — TUBING SET, COOL POINT, 2.6M, INDIVIDUAL

## (undated) DEVICE — CABLE, CONNECTOR, 10FT (REPROCESSED)

## (undated) DEVICE — CATHETER, ABLATION, TACTICATH, SENSOR ENABLE, BI-D, DF CURVE

## (undated) DEVICE — CABLE, LIVEWIRE/ RESPONSE 20 PIN ENSITE X

## (undated) DEVICE — CLOSURE SYSTEM, VASCULAR, MVP 6-12FR, VENOUS

## (undated) DEVICE — NEEDLE, NRG TRANSSEPTAL, 98CM, CURVE C0

## (undated) DEVICE — SHEATH, STEERABLE, SUREFLEX, MEDIUM CURVE

## (undated) DEVICE — CABLE, ADVISOR HD/VL SE SENSOR, 22 PIN ENSITE X

## (undated) DEVICE — ELECTRODE KIT, ENSITE X EP SYSTEM SURFACE

## (undated) DEVICE — CATHETER, ACUSON ACUNAV ULTRASOUND, 8FR 90CM

## (undated) DEVICE — INTRODUCER, SHEATH, FAST-CATH, 8FR X 12CM, C-LOCK

## (undated) DEVICE — CATHETER, EPL, 7FR DUO, 2/8 2M 95CM, STEERABLE LGCRL